# Patient Record
Sex: FEMALE | Race: WHITE | NOT HISPANIC OR LATINO | Employment: UNEMPLOYED | URBAN - METROPOLITAN AREA
[De-identification: names, ages, dates, MRNs, and addresses within clinical notes are randomized per-mention and may not be internally consistent; named-entity substitution may affect disease eponyms.]

---

## 2017-08-08 ENCOUNTER — PATIENT OUTREACH (OUTPATIENT)
Dept: ADMINISTRATIVE | Facility: HOSPITAL | Age: 42
End: 2017-08-08

## 2018-02-07 ENCOUNTER — CLINICAL SUPPORT (OUTPATIENT)
Dept: SMOKING CESSATION | Facility: CLINIC | Age: 43
End: 2018-02-07
Payer: COMMERCIAL

## 2018-02-07 DIAGNOSIS — F17.200 NICOTINE DEPENDENCE: Primary | ICD-10-CM

## 2018-02-07 PROCEDURE — 99406 BEHAV CHNG SMOKING 3-10 MIN: CPT | Mod: S$GLB,,, | Performed by: INTERNAL MEDICINE

## 2022-09-01 ENCOUNTER — TELEPHONE (OUTPATIENT)
Dept: UROLOGY | Facility: CLINIC | Age: 47
End: 2022-09-01
Payer: OTHER GOVERNMENT

## 2022-09-01 ENCOUNTER — OFFICE VISIT (OUTPATIENT)
Dept: UROLOGY | Facility: CLINIC | Age: 47
End: 2022-09-01
Payer: OTHER GOVERNMENT

## 2022-09-01 ENCOUNTER — LAB VISIT (OUTPATIENT)
Dept: LAB | Facility: HOSPITAL | Age: 47
End: 2022-09-01
Attending: NURSE PRACTITIONER
Payer: OTHER GOVERNMENT

## 2022-09-01 VITALS
HEIGHT: 69 IN | SYSTOLIC BLOOD PRESSURE: 131 MMHG | DIASTOLIC BLOOD PRESSURE: 85 MMHG | BODY MASS INDEX: 28.28 KG/M2 | HEART RATE: 81 BPM | RESPIRATION RATE: 18 BRPM | WEIGHT: 190.94 LBS

## 2022-09-01 DIAGNOSIS — R35.1 NOCTURIA: ICD-10-CM

## 2022-09-01 DIAGNOSIS — N32.81 OAB (OVERACTIVE BLADDER): ICD-10-CM

## 2022-09-01 DIAGNOSIS — N32.81 OAB (OVERACTIVE BLADDER): Primary | ICD-10-CM

## 2022-09-01 DIAGNOSIS — N39.3 STRESS INCONTINENCE OF URINE: ICD-10-CM

## 2022-09-01 LAB
CREAT SERPL-MCNC: 0.7 MG/DL (ref 0.5–1.4)
EST. GFR  (NO RACE VARIABLE): >60 ML/MIN/1.73 M^2

## 2022-09-01 PROCEDURE — 36415 COLL VENOUS BLD VENIPUNCTURE: CPT | Performed by: NURSE PRACTITIONER

## 2022-09-01 PROCEDURE — 99999 PR PBB SHADOW E&M-EST. PATIENT-LVL IV: CPT | Mod: PBBFAC,,, | Performed by: NURSE PRACTITIONER

## 2022-09-01 PROCEDURE — 99214 OFFICE O/P EST MOD 30 MIN: CPT | Mod: PBBFAC | Performed by: NURSE PRACTITIONER

## 2022-09-01 PROCEDURE — 99999 PR PBB SHADOW E&M-EST. PATIENT-LVL IV: ICD-10-PCS | Mod: PBBFAC,,, | Performed by: NURSE PRACTITIONER

## 2022-09-01 PROCEDURE — 99204 PR OFFICE/OUTPT VISIT, NEW, LEVL IV, 45-59 MIN: ICD-10-PCS | Mod: S$PBB,,, | Performed by: NURSE PRACTITIONER

## 2022-09-01 PROCEDURE — 99204 OFFICE O/P NEW MOD 45 MIN: CPT | Mod: S$PBB,,, | Performed by: NURSE PRACTITIONER

## 2022-09-01 PROCEDURE — 82565 ASSAY OF CREATININE: CPT | Performed by: NURSE PRACTITIONER

## 2022-09-01 RX ORDER — SOLIFENACIN SUCCINATE 10 MG/1
10 TABLET, FILM COATED ORAL DAILY
Qty: 30 TABLET | Refills: 11 | Status: SHIPPED | OUTPATIENT
Start: 2022-09-01

## 2022-09-01 NOTE — PROGRESS NOTES
Chief Complaint:   Stress incontinence  Daytime frequency    HPI:   Patient is a 47-year-old female that is presenting with an increase in stress incontinence and daytime frequency.  Patient states that nocturia twice nightly.  She is currently on oxybutynin 10 mg once daily and states that medication is not decreasing her overactive bladder symptoms.  Urine in clinic is negative.  PVR was 2 mL.  Unfortunately, patient does drink caffeinated drinks throughout the day and smokes.No abd/pelvic pain and no exac/rel factors.  No hematuria.  No urolithiasis.      Allergies:  Penicillins    Medications:  has a current medication list which includes the following prescription(s): acyclovir, alprazolam, azithromycin, ibuprofen, nicotine, nicotine (polacrilex), and solifenacin.    Review of Systems:  General: No fever, chills, fatigability, or weight loss.  Skin: No rashes, itching, or changes in color or texture of skin.  Chest: Denies PATRICIO, cyanosis, wheezing, cough, and sputum production.  Abdomen: Appetite fine. No weight loss. Denies diarrhea, abdominal pain, hematemesis, or blood in stool.  Musculoskeletal: No joint stiffness or swelling. Denies back pain.  : As above.  All other review of systems negative.    PMH:   has a past medical history of Adjustment disorder with mixed anxiety and depressed mood, Herpes labialis, and HSV-2 (herpes simplex virus 2) infection.    PSH:   has a past surgical history that includes Tubal ligation and Mouth surgery (02/2014).    FamHx: family history includes Breast cancer in her mother; COPD in her father; Cancer in her maternal aunt; Heart disease in her father; Hypertension in her father; Liver cancer in her maternal uncle; Lung cancer in her maternal grandfather; Stroke in her father.    SocHx:  reports that she has been smoking cigarettes. She has been smoking an average of .5 packs per day. She does not have any smokeless tobacco history on file. She reports that she does not  drink alcohol and does not use drugs.      Physical Exam:  Vitals:    09/01/22 1509   BP: 131/85   Pulse: 81   Resp: 18     General:  Extremely anxious female, A&Ox3, no apparent distress, no deformities  Neck: No masses, normal thyroid  Lungs: normal inspiration, no use of accessory muscles  Heart: normal pulse, no arrhythmias  Abdomen: Soft, NT, ND, no masses, no hernias, no hepatosplenomegaly  Lymphatic: Neck and groin nodes negative    Labs/Studies:   See HPI    Impression/Plan:   Overactive bladder and stress incontinence  Patient was educated on behavior modifications needed to decrease her urinary symptoms.  Patient to discontinue oxybutynin and VESIcare prescription given to patient.  Patient has to take her prescriptions to the [facility].  PT pelvic floor training referral was placed.  Patient is very concerned that her kidneys are not functioning well, therefore, patient was sent for creatinine labs today patient to return to clinic for re-evaluation in 2 months.

## 2022-09-01 NOTE — TELEPHONE ENCOUNTER
Reached out to pt to notify her that Leonila Schwartz will not be in clinic this morning and will be here in the afternoon. Pt asked what days is she available and I informed her that Ms. Schwartz is available Mon-Thur 8am-5pm. Pt stated that she will call back.

## 2022-09-01 NOTE — TELEPHONE ENCOUNTER
Attempt  to reach out to pt regarding her message but was unsuccessful. LVM informing the pt that she can call us back.

## 2022-09-06 ENCOUNTER — PATIENT MESSAGE (OUTPATIENT)
Dept: UROLOGY | Facility: CLINIC | Age: 47
End: 2022-09-06
Payer: OTHER GOVERNMENT

## 2022-09-06 NOTE — TELEPHONE ENCOUNTER
Attempted to call pt to inform her that her  creatinine is completely normal, therefore, her her kidneys are functioning perfectly.  No answer.  Phone just rang. Her kidneys are functioning normally and there is no concern at all.

## 2022-09-15 ENCOUNTER — TELEPHONE (OUTPATIENT)
Dept: UROLOGY | Facility: CLINIC | Age: 47
End: 2022-09-15
Payer: OTHER GOVERNMENT

## 2022-09-15 NOTE — TELEPHONE ENCOUNTER
Informed pt that Leonila Schwartz was out on vacation for two weeks. Pt stated that she wanted to know the results for her creatinine and that she didn't want to speak w/ the nurse about it. Pt stated that Ms. Jeong told her that her kidneys were not functioning properly and asked if there was a message from the encounter. We notified the pt that we did not see a message stating that Ms. Jeong spoke to her but instead there was a message stating that per Leonila Schwartz, her kidney functions are normal. She was then notified of her next upcoming appt w/ Leonila Schwartz. Pt vu

## 2022-10-03 ENCOUNTER — CLINICAL SUPPORT (OUTPATIENT)
Dept: REHABILITATION | Facility: HOSPITAL | Age: 47
End: 2022-10-03
Payer: OTHER GOVERNMENT

## 2022-10-03 DIAGNOSIS — N32.81 OAB (OVERACTIVE BLADDER): ICD-10-CM

## 2022-10-03 DIAGNOSIS — M62.89 PELVIC FLOOR DYSFUNCTION IN FEMALE: ICD-10-CM

## 2022-10-03 DIAGNOSIS — R35.1 NOCTURIA: ICD-10-CM

## 2022-10-03 DIAGNOSIS — N39.3 STRESS INCONTINENCE OF URINE: ICD-10-CM

## 2022-10-03 PROCEDURE — 97162 PT EVAL MOD COMPLEX 30 MIN: CPT | Mod: PN

## 2022-10-03 PROCEDURE — 97530 THERAPEUTIC ACTIVITIES: CPT | Mod: PN

## 2022-10-03 NOTE — PATIENT INSTRUCTIONS
"Kegels- Supported Posterior Pelvic Tilt    Begin lying on your back with your knees bent and feet flat on the floor, and a firm pillow under your pelvis to help maintain an posteriorly rotated pelvis as shown in the picture. This will help flatten the arch in your back so that your back remains flat on the floor.       Once youre in position, begin with taking deep belly breaths while you bring awareness to your pelvic floor.  Consciously try to get a feel for what it feels like to contract and relax your pelvic floor muscles so you can feel the difference between shortening (pulling in) and lengthening (letting go).     Now picture your pelvic floor as an elevator. When you INHALE deeply contract the pelvic floor muscle and make the elevator go up a couple of floors.  Hold the elevator up for 5 seconds.   Now EXHALE all the air out through your mouth while you relax the pelvic floor muscles SLOWLY letting the "elevator go back down to the ground level" slowly letting that tension go for about 5 seconds.    Once your pelvic floor muscles are relaxed and the elevator is back at ground level rest for one whole breathing cycle of inhalation/exhalation about 10 seconds.      Repeat  this a total of 10 repetitions of raising and lowering the elevator with 10 second rest in between each repetition.            Pelvic floor contraction/Kegel with activity     Sit upright with good posture or remain in whatever position you are in when the laughing/coughing/sneezing occurs. Contract your pelvic floor muscles as if you are holding back urine or holding back gas when you are about to laugh/cough/sneeze.  Hold this contract for as long as the stressful activity continues (coughing, sneezing, laughing) while continuing your normal breathing pattern. Be sure to perform a full relax in between each contract. Do not hold your breath.      Home Exercise Program: 10/03/2022    CONTROLLING URINARY / FECAL URGENCY    What to do when " you experience a strong urge to urinate or defecate:     FIRST  Stop activity, stand quietly or sit down. Try to stay very still to maintain control. Avoid rushing to the toilet.    SECOND Begin Quick Flicks (1 second LIFT of pelvic floor muscles, 4 second DROP). Pelvic floor contractions send a message to the bladder to relax and hold urine.     THIRD Relax. Do not rush to the toilet. Take a deep belly or diaphragmatic breath and let it out slowly. Let the urge to urinate pass by using distraction techniques and positive thoughts. Try not to think about going to the bathroom.     FINALLY If the urge returns, repeat the above steps to regain control. When you feel the urge subside, walk normally to the bathroom. You can urinate once the urge has subsided.        The urge feeling strikes!   Stop, breathe, and be still and then begin Quick Flicks     Do Not rush to the toilet.     Think positively and distract yourself.

## 2022-10-03 NOTE — PLAN OF CARE
"[facility]   Physical Therapy Initial Evaluation     Date: 10/3/2022   Name: Diana Meneses  Clinic Number: [id number]    Therapy Diagnosis:   Encounter Diagnoses   Name Primary?    OAB (overactive bladder)     Stress incontinence of urine     Nocturia     Pelvic floor dysfunction in female      Physician: Leonila Schwartz NP    Physician Orders: PT Eval and Treat   Medical Diagnosis from Referral:   N32.81 (ICD-10-CM) - OAB (overactive bladder)   N39.3 (ICD-10-CM) - Stress incontinence of urine   R35.1 (ICD-10-CM) - Nocturia     Evaluation Date: 10/3/2022  Authorization Period Expiration: 2023  Plan of Care Expiration: 1/3/2023  Progress Note Due: 11/3/2022  Visit # / Visits authorized:    FOTO: 1/3    Precautions: Standard and Diabetes     Time In: 11:36  Time Out: 12:33  Total Appointment Time (timed & untimed codes): 57 minutes    SUBJECTIVE     Date of onset: Patient reports urinary incontinence for 2 years and pelvic pain for the past 2 months.     History of current condition - Diana reports: her primary care gave her medicine for urinary incontinence and it wasn't working. Primary care then sent her to urologist. The urologist changed her medicine and "it changed her life." She really doesn't have problems with incontinence now. Her incontinence is 90% better since starting medicine. A month before she saw the urologist she had a bladder infection and has another bladder infection. She takes 13 other pills a day and doesn't want to take another antibiotic. This is her 2nd bladder infection in 2 months. She recently had 10 days of a very heavy bleeding period. Her pelvic area is really hurting. She finished her most recent antibiotic, but feels like she is getting another one. She stills leaks urine with stress now,. The medicine has resolved her urge incontinence.  Patient reports she has chronic pain and nausea from the pain.         OB/GYN History: , vaginal delivery, " episiotomy, vaginal dryness, vaginal erythema, and painful vaginal penetration  Using vaginal estrogen cream: No  Sexually active? No for the past 4 months, too painful   Pain with vaginal exams, intercourse or tampon use? with intercourse and with vaginal exam    Bladder/Bowel History: recurrent bladder infections and urinary incontinence  Frequency of urination:   Daytime: high frequency secondary to diabetes           Nighttime: twice   Difficulty initiating urine stream: No  Urine stream: strong  Complete emptying: Yes  Bladder leakage: Yes  Activities that cause leakage: stress  Frequency of incidents: 2-3 times a day   Amount leaked (urine): few drops  Urinary Urgency: No.  Able to delay the urge for at least 0-5 minute(s).  Pain with delaying the urge to urinate: No     Frequency of bowel movements: 3 times a day to once every two days  Difficulty initiating BM: No  Quality/Shape of BM: normal  Does Patient Feel Empty after BM? Yes  Bowel Urgency: No.  Able to delay the urge for at least 0-60 minute(s).  Fiber Supplements or Laxative Use? No   Pain with BM: No   Bleeding with BM: No   Colon leakage: No      Form of protection: panty liner   Number of pads required in 24 hours: 2-3 times     Pain:  Location: pelvic pain   Current 8/10, worst 10/10, best 5/10   Pelvic Pain Duration Occurs during and after provocation   Location of Pelvic Pain: Deep pelvic floor muscles   Description: bloated and throbbing   Aggravating Factors/Activities that cause symptoms: Vaginal exam/provocation   Easing Factors: nothing      Medical History: Diana  has a past medical history of Adjustment disorder with mixed anxiety and depressed mood, Herpes labialis, and HSV-2 (herpes simplex virus 2) infection.     Surgical History: Diana Meneses  has a past surgical history that includes Tubal ligation and Mouth surgery (02/2014).    Medications: Diana has a current medication list which includes the following prescription(s):  acyclovir, alprazolam, azithromycin, ibuprofen, nicotine, nicotine (polacrilex), and solifenacin.    Allergies:   Review of patient's allergies indicates:   Allergen Reactions    Penicillins Other (See Comments)     unknown        Imaging none    Prior [facility]/Previous [facility] included[facility]   Social History: lives with spouse   Occupation: not working out of the home.   Prior Level of Function: Pt was independent with all ADLs and iADLs without pain, no reports of incontinence of bowel or bladder.  Current Level of Function: Patient is modified independent with functional mobility and ADLs secondary to being limited by pain and urinary incontinence     Types of fluid intake: Water: 1 bottle a /day, Coffee: 0-1/day, and Soda  Diet: Standard  Habitus: overweight  Abuse/Neglect: Pt denies a history of physical or emotional abuse at this visit.       Pts goals: to get rid of pain and urinary incontinence     OBJECTIVE     See EMR under MEDIA for written consent provided 10/3/2022, signed consent then verbally refused pelvic floor exam, deferred exam at this time   Chaperone: declined    ORTHO SCREEN  Posture in sitting: slouched  and sacral sitting  Posture in standing: pain posturing evident   Pelvic alignment: Not assessed today    Adductor Palpation: tender and boggy    ABDOMINAL WALL ASSESSMENT  Palpation: hypotonic    Diastasis: absent: only 1-1.5 cm gap      BREATHING MECHANICS ASSESSMENT   Thorax Assessment During Quiet Respiration: Decreased excursion of abdominal wall  and Decreased excursion bilaterally of lateral ribs         TREATMENT     Treatment Time In: 12:23  Treatment Time Out: 12:33  Total Treatment time (time-based codes) separate from Evaluation: 10 minutes    Therapeutic Activity Patient participated in dynamic functional therapeutic activities to improve functional performance for 10 minutes. Including: Education as described below.     Patient Education  provided:   general anatomy/physiology of urinary/ bowel  system, benefits of treatment, risks of treatment, and alternative methods of treatment was discussed with the pt. Additionally, bladder irritants, anatomy/physiology of pelvic floor, posture/body mechanices, bladder retraining, diaphragmatic breathing, kegels, fluid intake/dietary modifications, and Coordination of kegels with functional activities such as cough, laugh, sneeze, lift, etc.  was reviewed.     Home Exercises provided:  Written Home Exercises provided: yes.  Exercises were reviewed and Diana was able to demonstrate them prior to the end of the session.    Diana demonstrated good  understanding of the education provided.     See EMR under Patient Instructions for exercises provided 10/3/2022.    ASSESSMENT     Diana is a 47 y.o. female referred to outpatient Physical Therapy with a medical diagnosis of   N32.81 (ICD-10-CM) - OAB (overactive bladder)   N39.3 (ICD-10-CM) - Stress incontinence of urine   R35.1 (ICD-10-CM) - Nocturia   Pt presents with altered posture, poor knowledge of body mechanics and posture, increased frequency of urination, increased nocturia, poor coordination of pelvic floor muscles during ADL's leading to urinary or fecal leakage, poor fluid intake, and chronic UTI due to dysfunctional voiding. Patient presents with signs and symptoms consistent with pelvic floor dysfunction.      Pt prognosis is Fair.   Pt will benefit from skilled outpatient Physical Therapy to address the deficits stated above and in the chart below, provide pt/family education, and to maximize pt's level of independence.     Plan of care discussed with patient: Yes  Pt's spiritual, cultural and educational needs considered and patient is agreeable to the plan of care and goals as stated below:     Anticipated Barriers for therapy: pain, decreased exercise tolerance, therapy compliance     Medical Necessity is demonstrated by the  "following:    History  Co-morbidities and personal factors that may impact the plan of care Co-morbidities   anxiety, consumption of bladder irritants, depression, diabetes, and recurrent urinary infections    Personal Factors  age     moderate   Examination  Body structures and functions, activity limitations and participation restrictions that may impact the plan of care Body Regions/Systems/Functions:  altered posture, poor knowledge of body mechanics and posture, increased frequency of urination, increased nocturia, poor coordination of pelvic floor muscles during ADL's leading to urinary or fecal leakage, poor fluid intake, and chronic UTI due to dysfunctional voiding     Activity Limitations:  urgency , delaying urge to urinate, pain with full bladder affecting ADL participation and/or sleep, intercourse/vaginal exam/tampon use without pain, sleep uninterrupted by excessive nocturia, incontinence with ADLs, and Pain with ADLs    Participation Restrictions:  all ADLs/iADLs uninterrupted by urinary incontinence/urgency/frequency, social activities with friends/family, relationship with spouse/partner, ADL participation affected by pain, and exercise restrictions due to incontinence     Activity limitations:   Learning and applying knowledge  no deficits    General Tasks and Commands  no deficits    Communication  no deficits    Mobility  lifting and carrying objects  walking    Self care  toileting    Domestic Life  shopping  cooking  doing house work (cleaning house, washing dishes, laundry)    Interactions/Relationships  intimate relationships    Life Areas  no deficits    Community and Social Life  community life  recreation and leisure       moderate   Clinical Presentation evolving clinical presentation with changing clinical characteristics moderate   Decision Making/ Complexity Score: moderate       Goals:  Short Term Goals: 4 weeks   Pt to perform "the knack" prior to coughing, laughing or sneezing to " decrease risk of incontinence.  Pt to report a decrease in pad usage to no more than 1 a day to demonstrate improving pelvic floor function needed for continence.  Pt to voice understanding of the role that diet plays on urinary urgency.    Pt to demonstrate proper diaphragmatic breathing to help with calming the nervous system in order to decrease pain and to improve abdominal wall musculature extensibility.   Pt to demonstrate good body mechanics when performing ADLs such as lifting and bending to decrease strain to lumbopelvic structures and reduce risk of further injury.  Pt to report minimal pain with palpation of abdominal wall due to improvement in soft tissue mobility from moderate to minimal restrictions.    Long Term Goals: 12 weeks   Pt to be discharged with home plan for carry over after discharge.    Pt to report a decrease in pad usage to 0 pads a day to demonstrate improving pelvic floor muscle controls as evidenced by decreased episodes of incontinence needed to improve confidence in social situations.  Pt will be trained and compliant with postural strategies in sitting and standing to improve alignment and decrease pain and muscle fatigue  Pt to report being able to have a comfortable vaginal exam without significant increase in pelvic pain.  Pt to report being able to hold urine for at least 10 without reporting a significant increase in pain to demonstrate a return towards prior level of function.       PLAN   Plan of care Certification: 10/3/2022 to 1/3/2023.    Outpatient Physical Therapy 1 times weekly for 12 weeks to include the following interventions: therapeutic exercises, therapeutic activity, neuromuscular re-education, manual therapy, modalities PRN, patient/family education, orthotic fabrication, and self care/home management    Cora Barrera, PT, DPT, PPCES      I CERTIFY THE NEED FOR THESE SERVICES FURNISHED UNDER THIS PLAN OF TREATMENT AND WHILE UNDER MY CARE   Physician's  comments:     Physician's Signature: ___________________________________________________

## 2022-10-14 NOTE — PROGRESS NOTES
"  Physical Therapy Daily Treatment Note     Name: Diana Kidd Gideon  Clinic Number: [id number]    Therapy Diagnosis:   Encounter Diagnosis   Name Primary?    Pelvic floor dysfunction in female Yes     Physician: Leonila Schwartz NP    Visit Date: 10/17/2022    Physician Orders: PT Eval and Treat   Medical Diagnosis from Referral:   N32.81 (ICD-10-CM) - OAB (overactive bladder)   N39.3 (ICD-10-CM) - Stress incontinence of urine   R35.1 (ICD-10-CM) - Nocturia      Evaluation Date: 10/3/2022  Authorization Period Expiration: 9/1/2023  Plan of Care Expiration: 1/3/2023  Progress Note Due: 11/3/2022  Visit # / Visits authorized: 1/ 14   FOTO: 1/3     Precautions: Standard and Diabetes     Time In: 4:10 (patient late)  Time Out: 4:50  Total Billable Time: 40 minutes    Precautions: Standard    Subjective     Pt reports: not knowing if she is doing the kegels correctly.   She was compliant with home exercise program.  Response to previous treatment: no change   Functional change: no change     Pain: 0/10  Location: non pain     Constitutional Symptoms Review: The patient denies having any constitutional symptoms.     Objective   NO PELVIC EXAM    Therapeutic Exercise to develop  strength, endurance, ROM, flexibility, posture, and core stabilization for 30 minutes including:   Bridges 2 x 10   SL clams with GTB 2 x 10    Steam boats x 10   Seated ritesh pose 10 x 5"   Supine butterfly 10 x 10"   SL add 2 x 10   Sit to stands with add ball 2 x 10   Alt HL hip abduction/add with ball 10 x 10 seconds   DKTC 2 x 10   Seated opp arm/ leg raises   SL hip abd 2 x 10   Anti rotations x 10 7#   SL reverse clams 2 x 10   posterior pelvic tilts 10 x 10 seconds   side stepping      Neuromuscular Re-education to develop Coordination and Control for 10 minutes including:   TA contraction 10" x 10               With march x 10               With knee fall out x 10               With kegel x 10   diaphragmatic breathing x 1 minutes   kegel Quick " "flicks with posterior pelvic tilt 2 x 10    With march    Endurance holds seated on towel 5" x 10               kegels with squats x 10 (up-relax-down)   Kegel on physio ball 2 x 10      Manual Therapy to develop flexibility, extensibility, and desensitization for 0 minutes including: none     Therapeutic Activity Education provided throughout session as described below.    Home Exercises Provided and Patient Education Provided     Education provided:   bladder irritants, anatomy/physiology of pelvic floor, posture/body mechanices, bladder retraining, diaphragmatic breathing, isometric abdominal exercises, kegels, behavior modifications, and Coordination of kegels with functional activities such as cough, laugh, sneeze, lift, etc.   Discussed progression of plan of care with patient; educated pt in activity modification; reviewed HEP with pt. Pt demonstrated and verbalized understanding of all instruction and was provided with a handout of HEP (see Patient Instructions).    Written Home Exercises Provided: yes.  Exercises were reviewed and Diana was able to demonstrate them prior to the end of the session.  Diana demonstrated good  understanding of the education provided.     See EMR under Patient Instructions for exercises provided 10/17/2022.    Assessment     Patient presents with urinary incontinence and increased urinary urge. Patient was started on pelvic floor muscle strengthening and stretching for optional length tension relationship of muscles. Core and hip strengthening exercises were performed for increased postural stability to better support pelvic floor musculature. Patient tolerated all exercises well with no complaints. Patient was taught Kegels seated on towel for tactile cue and patient reports she felt more confident she was doing correctly. Pt will continue to benefit from skilled outpatient physical therapy to address the deficits listed in the problem list box on initial evaluation, provide " "pt/family education and to maximize pt's level of independence in the home and community environment.     Diana Is progressing well towards her goals.   Pt prognosis is Good.     Anticipated barriers to physical therapy: pain, decreased exercise tolerance     Progress towards goals:  good    Goals:   Short Term Goals: 4 weeks   Pt to perform "the knack" prior to coughing, laughing or sneezing to decrease risk of incontinence.  Pt to report a decrease in pad usage to no more than 1 a day to demonstrate improving pelvic floor function needed for continence.  Pt to voice understanding of the role that diet plays on urinary urgency.    Pt to demonstrate proper diaphragmatic breathing to help with calming the nervous system in order to decrease pain and to improve abdominal wall musculature extensibility.   Pt to demonstrate good body mechanics when performing ADLs such as lifting and bending to decrease strain to lumbopelvic structures and reduce risk of further injury.  Pt to report minimal pain with palpation of abdominal wall due to improvement in soft tissue mobility from moderate to minimal restrictions.     Long Term Goals: 12 weeks   Pt to be discharged with home plan for carry over after discharge.    Pt to report a decrease in pad usage to 0 pads a day to demonstrate improving pelvic floor muscle controls as evidenced by decreased episodes of incontinence needed to improve confidence in social situations.  Pt will be trained and compliant with postural strategies in sitting and standing to improve alignment and decrease pain and muscle fatigue  Pt to report being able to have a comfortable vaginal exam without significant increase in pelvic pain.  Pt to report being able to hold urine for at least 10 without reporting a significant increase in pain to demonstrate a return towards prior level of function.     New/Revised goals:  Continue with current established goals at this time.      Pt's spiritual, cultural " and educational needs considered and pt agreeable to plan of care and goals.    Plan     Plan of care Certification: 10/3/2022 to 1/3/2023.     Continue with established Plan of Care, working toward established PT goals.    Cora Barrera, PT, DPT, PPCES

## 2022-10-17 ENCOUNTER — CLINICAL SUPPORT (OUTPATIENT)
Dept: REHABILITATION | Facility: HOSPITAL | Age: 47
End: 2022-10-17
Payer: OTHER GOVERNMENT

## 2022-10-17 DIAGNOSIS — M62.89 PELVIC FLOOR DYSFUNCTION IN FEMALE: Primary | ICD-10-CM

## 2022-10-17 PROCEDURE — 97112 NEUROMUSCULAR REEDUCATION: CPT | Mod: PN

## 2022-10-17 PROCEDURE — 97110 THERAPEUTIC EXERCISES: CPT | Mod: PN

## 2022-10-25 NOTE — PROGRESS NOTES
"  Physical Therapy Daily Treatment Note     Name: Diana Kidd Gideon  Clinic Number: [id number]    Therapy Diagnosis:   Encounter Diagnosis   Name Primary?    Pelvic floor dysfunction in female Yes     Physician: Leonila Schwartz NP    Visit Date: 10/26/2022    Physician Orders: PT Eval and Treat   Medical Diagnosis from Referral:   N32.81 (ICD-10-CM) - OAB (overactive bladder)   N39.3 (ICD-10-CM) - Stress incontinence of urine   R35.1 (ICD-10-CM) - Nocturia      Evaluation Date: 10/3/2022  Authorization Period Expiration: 9/1/2023  Plan of Care Expiration: 1/3/2023  Progress Note Due: 11/3/2022  Visit # / Visits authorized: 2/ 14   FOTO: 1/3     Precautions: Standard and Diabetes     Time In: 11:02  Time Out: 11:56  Total Billable Time: 54 minutes    Precautions: Standard    Subjective     Pt reports: she feels normal. Patient reports not having any incontinence and decreased urinary urgency. Patient reports having no pain today.  She was compliant with home exercise program.  Response to previous treatment: no change   Functional change: no change     Pain: 0/10  Location: non pain     Constitutional Symptoms Review: The patient denies having any constitutional symptoms.     Objective   NO PELVIC EXAM    Therapeutic Exercise to develop  strength, endurance, ROM, flexibility, posture, and core stabilization for 30 minutes including:   Bridges 2 x 10   SL clams with GTB 2 x 10    Steam boats x 10   Seated ritesh pose 10" x 5   Supine butterfly 10 x 10"   SL add 2 x 10   Sit to stands with add ball 2 x 10   Alt HL hip abduction/add with ball 10 x 10 seconds   DKTC 2 x 10   Seated opp arm/ leg raises   SL hip abd 2 x 10   + Anti rotations 2 x 10 7#   SL reverse clams 2 x 10   posterior pelvic tilts 10 x 10 seconds   side stepping      Neuromuscular Re-education to develop Coordination and Control for 24 minutes including:   TA contraction 5" x 10               With march x 10               With knee fall out x 10             " "  With kegel x 10   diaphragmatic breathing x 1 minutes   kegel Quick flicks with posterior pelvic tilt 2 x 10    With march 2 x 10    Endurance holds seated on towel 5" x 10               kegels with squats x 10 (up-relax-down)   Kegel on physio ball 2 x 10      Manual Therapy to develop flexibility, extensibility, and desensitization for 0 minutes including: none     Therapeutic Activity Education provided throughout session as described below.    Home Exercises Provided and Patient Education Provided     Education provided:   bladder irritants, anatomy/physiology of pelvic floor, posture/body mechanices, bladder retraining, diaphragmatic breathing, isometric abdominal exercises, kegels, behavior modifications, and Coordination of kegels with functional activities such as cough, laugh, sneeze, lift, etc.   Discussed progression of plan of care with patient; educated pt in activity modification; reviewed HEP with pt. Pt demonstrated and verbalized understanding of all instruction and was provided with a handout of HEP (see Patient Instructions).    Written Home Exercises Provided: yes.  Exercises were reviewed and Diana was able to demonstrate them prior to the end of the session.  Diana demonstrated good  understanding of the education provided.     See EMR under Patient Instructions for exercises provided 10/17/2022.    Assessment     Patient presents with resolution of urinary incontinence and urinary urgency. Anti rotations were performed for increased core stability. Patient is progressing well with therapy and has met 6 out of 11 goals. Patient tolerated all exercises well with no complaints. Pt will continue to benefit from skilled outpatient physical therapy to address the deficits listed in the problem list box on initial evaluation, provide pt/family education and to maximize pt's level of independence in the home and community environment.     Diana Is progressing well towards her goals.   Pt " "prognosis is Good.     Anticipated barriers to physical therapy: pain, decreased exercise tolerance     Progress towards goals:  good    Goals:   Short Term Goals: 4 weeks   Pt to perform "the knack" prior to coughing, laughing or sneezing to decrease risk of incontinence. MET 10/26/2022  Pt to report a decrease in pad usage to no more than 1 a day to demonstrate improving pelvic floor function needed for continence. MET 10/26/2022  Pt to voice understanding of the role that diet plays on urinary urgency.  MET 10/26/2022  Pt to demonstrate proper diaphragmatic breathing to help with calming the nervous system in order to decrease pain and to improve abdominal wall musculature extensibility. MET 10/26/2022  Pt to demonstrate good body mechanics when performing ADLs such as lifting and bending to decrease strain to lumbopelvic structures and reduce risk of further injury.  Pt to report minimal pain with palpation of abdominal wall due to improvement in soft tissue mobility from moderate to minimal restrictions.     Long Term Goals: 12 weeks   Pt to be discharged with home plan for carry over after discharge.    Pt to report a decrease in pad usage to 0 pads a day to demonstrate improving pelvic floor muscle controls as evidenced by decreased episodes of incontinence needed to improve confidence in social situations. MET 10/26/2022  Pt will be trained and compliant with postural strategies in sitting and standing to improve alignment and decrease pain and muscle fatigue  Pt to report being able to have a comfortable vaginal exam without significant increase in pelvic pain.  Pt to report being able to hold urine for at least 10 without reporting a significant increase in pain to demonstrate a return towards prior level of function. MET 10/26/2022    New/Revised goals:  Continue with current established goals at this time.      Pt's spiritual, cultural and educational needs considered and pt agreeable to plan of care and " goals.    Plan     Plan of care Certification: 10/3/2022 to 1/3/2023.     Continue with established Plan of Care, working toward established PT goals.    Cora Barrera, PT, DPT, PPCES

## 2022-10-26 ENCOUNTER — CLINICAL SUPPORT (OUTPATIENT)
Dept: REHABILITATION | Facility: HOSPITAL | Age: 47
End: 2022-10-26
Payer: OTHER GOVERNMENT

## 2022-10-26 DIAGNOSIS — M62.89 PELVIC FLOOR DYSFUNCTION IN FEMALE: Primary | ICD-10-CM

## 2022-10-26 PROCEDURE — 97110 THERAPEUTIC EXERCISES: CPT | Mod: PN

## 2022-10-26 PROCEDURE — 97112 NEUROMUSCULAR REEDUCATION: CPT | Mod: PN

## 2022-10-28 NOTE — PROGRESS NOTES
"  Physical Therapy Daily Treatment Note     Name: Diana Kidd Gideon  Clinic Number:[id number]    Therapy Diagnosis:   Encounter Diagnosis   Name Primary?    Pelvic floor dysfunction in female Yes       Physician: Leonila Schwartz NP    Visit Date: 10/31/2022    Physician Orders: PT Eval and Treat   Medical Diagnosis from Referral:   N32.81 (ICD-10-CM) - OAB (overactive bladder)   N39.3 (ICD-10-CM) - Stress incontinence of urine   R35.1 (ICD-10-CM) - Nocturia      Evaluation Date: 10/3/2022  Authorization Period Expiration: 9/1/2023  Plan of Care Expiration: 1/3/2023  Progress Note Due: 11/3/2022  Visit # / Visits authorized: 3/ 14   FOTO: 1/3     Precautions: Standard and Diabetes     Time In: 11:31  Time Out: 12:16  Total Billable Time: 45 minutes    Precautions: Standard    Subjective     Pt reports: she feels normal. Patient reports not having any incontinence and decreased urinary urgency. Patient reports having no pain today. Patient reports feeling to make today her last day.   She was compliant with home exercise program.  Response to previous treatment: no change   Functional change: no change     Pain: 0/10  Location: non pain     Constitutional Symptoms Review: The patient denies having any constitutional symptoms.     Objective   NO PELVIC EXAM    Therapeutic Exercise to develop  strength, endurance, ROM, flexibility, posture, and core stabilization for 30 minutes including:   Bridges 2 x 10   SL clams with GTB 2 x 10    Steam boats x 10   Seated ritesh pose 10" x 5   Supine butterfly 10 x 10"   SL add 2 x 10   Sit to stands with add ball 2 x 10   Alt HL hip abduction/add with ball 10 x 10 seconds   DKTC 2 x 10   Seated opp arm/ leg raises   SL hip abd 2 x 10   Anti rotations 2 x 10 7#   SL reverse clams 2 x 10   posterior pelvic tilts 10 x 10 seconds   side stepping      Neuromuscular Re-education to develop Coordination and Control for 15 minutes including:   TA contraction 5" x 10               With march x " "10               With knee fall out x 10               With kegel x 10   diaphragmatic breathing x 1 minutes   kegel Quick flicks with posterior pelvic tilt 2 x 10    With march 2 x 10    Endurance holds seated on towel 5" x 10               kegels with squats x 10 (up-relax-down)   Kegel on physio ball 2 x 10      Manual Therapy to develop flexibility, extensibility, and desensitization for 0 minutes including: none     Therapeutic Activity Education provided throughout session as described below.    Home Exercises Provided and Patient Education Provided     Education provided:   bladder irritants, anatomy/physiology of pelvic floor, posture/body mechanices, bladder retraining, diaphragmatic breathing, isometric abdominal exercises, kegels, behavior modifications, and Coordination of kegels with functional activities such as cough, laugh, sneeze, lift, etc.   Discussed progression of plan of care with patient; educated pt in activity modification; reviewed HEP with pt. Pt demonstrated and verbalized understanding of all instruction and was provided with a handout of HEP (see Patient Instructions).    Written Home Exercises Provided: yes.  Exercises were reviewed and Diana was able to demonstrate them prior to the end of the session.  Diana demonstrated good  understanding of the education provided.     See EMR under Patient Instructions for exercises provided 10/17/2022.    Assessment     Patient presents reporting complete resolution of all of her symptoms. Patient reports being ready to discharge from therapy. Patient  has met of of her goals and has no farther need for pelvic floor therapy at this time.     Diana Is progressing well towards her goals.   Pt prognosis is Good.     Anticipated barriers to physical therapy: pain, decreased exercise tolerance     Progress towards goals:  good    Goals:   Short Term Goals: 4 weeks   Pt to perform "the knack" prior to coughing, laughing or sneezing to decrease risk " of incontinence. MET 10/26/2022  Pt to report a decrease in pad usage to no more than 1 a day to demonstrate improving pelvic floor function needed for continence. MET 10/26/2022  Pt to voice understanding of the role that diet plays on urinary urgency.  MET 10/26/2022  Pt to demonstrate proper diaphragmatic breathing to help with calming the nervous system in order to decrease pain and to improve abdominal wall musculature extensibility. MET 10/26/2022  Pt to demonstrate good body mechanics when performing ADLs such as lifting and bending to decrease strain to lumbopelvic structures and reduce risk of further injury. MET 11/1/2022  Pt to report minimal pain with palpation of abdominal wall due to improvement in soft tissue mobility from moderate to minimal restrictions. MET 11/1/2022     Long Term Goals: 12 weeks   Pt to be discharged with home plan for carry over after discharge.  MET 11/1/2022  Pt to report a decrease in pad usage to 0 pads a day to demonstrate improving pelvic floor muscle controls as evidenced by decreased episodes of incontinence needed to improve confidence in social situations. MET 10/26/2022  Pt will be trained and compliant with postural strategies in sitting and standing to improve alignment and decrease pain and muscle fatigueMET 11/1/2022  Pt to report being able to have a comfortable vaginal exam without significant increase in pelvic pain. Did not assess  Pt to report being able to hold urine for at least 10 without reporting a significant increase in pain to demonstrate a return towards prior level of function. MET 10/26/2022    New/Revised goals:  Continue with current established goals at this time.      Pt's spiritual, cultural and educational needs considered and pt agreeable to plan of care and goals.    Plan     Plan of care Certification: 10/3/2022 to 1/3/2023.     Discharge from pelvic floor physcial therapy     Cora Barrera, PT, DPT, PPCES

## 2022-10-31 ENCOUNTER — CLINICAL SUPPORT (OUTPATIENT)
Dept: REHABILITATION | Facility: HOSPITAL | Age: 47
End: 2022-10-31
Payer: OTHER GOVERNMENT

## 2022-10-31 DIAGNOSIS — M62.89 PELVIC FLOOR DYSFUNCTION IN FEMALE: Primary | ICD-10-CM

## 2022-10-31 PROCEDURE — 97110 THERAPEUTIC EXERCISES: CPT | Mod: PN

## 2022-10-31 PROCEDURE — 97112 NEUROMUSCULAR REEDUCATION: CPT | Mod: PN

## 2022-11-01 ENCOUNTER — OFFICE VISIT (OUTPATIENT)
Dept: UROLOGY | Facility: CLINIC | Age: 47
End: 2022-11-01
Payer: OTHER GOVERNMENT

## 2022-11-01 VITALS
BODY MASS INDEX: 28.14 KG/M2 | WEIGHT: 190 LBS | SYSTOLIC BLOOD PRESSURE: 130 MMHG | HEIGHT: 69 IN | HEART RATE: 90 BPM | DIASTOLIC BLOOD PRESSURE: 79 MMHG

## 2022-11-01 DIAGNOSIS — N32.81 OAB (OVERACTIVE BLADDER): Primary | ICD-10-CM

## 2022-11-01 PROCEDURE — 99999 PR PBB SHADOW E&M-EST. PATIENT-LVL III: CPT | Mod: PBBFAC,,, | Performed by: NURSE PRACTITIONER

## 2022-11-01 PROCEDURE — 99214 OFFICE O/P EST MOD 30 MIN: CPT | Mod: S$PBB,,, | Performed by: NURSE PRACTITIONER

## 2022-11-01 PROCEDURE — 99999 PR PBB SHADOW E&M-EST. PATIENT-LVL III: ICD-10-PCS | Mod: PBBFAC,,, | Performed by: NURSE PRACTITIONER

## 2022-11-01 PROCEDURE — 99214 PR OFFICE/OUTPT VISIT, EST, LEVL IV, 30-39 MIN: ICD-10-PCS | Mod: S$PBB,,, | Performed by: NURSE PRACTITIONER

## 2022-11-01 PROCEDURE — 99213 OFFICE O/P EST LOW 20 MIN: CPT | Mod: PBBFAC | Performed by: NURSE PRACTITIONER

## 2022-11-01 NOTE — PROGRESS NOTES
Chief Complaint:   Overactive bladder    HPI:   Patient is a 47-year-old female that is presenting as a follow-up to [facility]Icare and pelvic floor training.  Patient states that she has had a complete resolution to all overactive bladder symptoms.  Is currently on VESIcare 10 mg once daily, denies adverse side effects.  Urine in clinic is negative.  PVR is 3 mL.  Patient is extremely satisfied with PT pelvic floor training and wants to complete her sessions.  09/01/2022  Patient is a 47-year-old female that is presenting with an increase in stress incontinence and daytime frequency.  Patient states that nocturia twice nightly.  She is currently on oxybutynin 10 mg once daily and states that medication is not decreasing her overactive bladder symptoms.  Urine in clinic is negative.  PVR was 2 mL.  Unfortunately, patient does drink caffeinated drinks throughout the day and smokes.No abd/pelvic pain and no exac/rel factors.  No hematuria.  No urolithiasis    Allergies:  Penicillins    Medications:  has a current medication list which includes the following prescription(s): acyclovir, alprazolam, azithromycin, ibuprofen, nicotine, nicotine (polacrilex), and solifenacin.    Review of Systems:  General: No fever, chills, fatigability, or weight loss.  Skin: No rashes, itching, or changes in color or texture of skin.  Chest: Denies PATRICIO, cyanosis, wheezing, cough, and sputum production.  Abdomen: Appetite fine. No weight loss. Denies diarrhea, abdominal pain, hematemesis, or blood in stool.  Musculoskeletal: No joint stiffness or swelling. Denies back pain.  : As above.  All other review of systems negative.    PMH:   has a past medical history of Adjustment disorder with mixed anxiety and depressed mood, Herpes labialis, and HSV-2 (herpes simplex virus 2) infection.    PSH:   has a past surgical history that includes Tubal ligation and Mouth surgery (02/2014).    FamHx: family history includes Breast cancer in her mother;  COPD in her father; Cancer in her maternal aunt; Heart disease in her father; Hypertension in her father; Liver cancer in her maternal uncle; Lung cancer in her maternal grandfather; Stroke in her father.    SocHx:  reports that she has been smoking cigarettes. She has been smoking an average of .5 packs per day. She does not have any smokeless tobacco history on file. She reports that she does not drink alcohol and does not use drugs.      Physical Exam:  Vitals:    11/01/22 1419   BP: 130/79   Pulse: 90     General: A&Ox3, no apparent distress, no deformities  Neck: No masses, normal thyroid  Lungs: normal inspiration, no use of accessory muscles  Heart: normal pulse, no arrhythmias  Abdomen: Soft, NT, ND, no masses, no hernias, no hepatosplenomegaly  Lymphatic: Neck and groin nodes negative    Labs/Studies:   See HPI    Impression/Plan:   Overactive bladder  Patient is extremely satisfied with current plan of care.  Has a complete resolution to all overactive bladder symptoms.  Nocturia is once nightly, sometimes none all.  Patient no longer wearing a pad.  Denies adverse side effects to VESIcare.  We will see her back in the clinic in 6 months for re-evaluation.  If still stable, we can see her annually for med refills.

## 2022-11-01 NOTE — PLAN OF CARE
"[facility]   Discharge Note    Name: Diana Meneses  Clinic Number: [id number]    Therapy Diagnosis:   Encounter Diagnosis   Name Primary?    Pelvic floor dysfunction in female Yes     Physician: Leonila Schwartz NP      Physician Orders: PT Eval and Treat   Medical Diagnosis from Referral:   N32.81 (ICD-10-CM) - OAB (overactive bladder)   N39.3 (ICD-10-CM) - Stress incontinence of urine   R35.1 (ICD-10-CM) - Nocturia      Evaluation Date: 10/3/2022    Date of Last visit: 10/31/2022  Total Visits Received: 3    ASSESSMENT      Patient presents reporting complete resolution of all of her symptoms. Patient reports being ready to discharge from therapy. Patient  has met of of her goals and has no farther need for pelvic floor therapy at this time.     Discharge reason: Patient is now asymptomatic, Patient has met all of his/her goals, and Patient requested discharge    Goals: Short Term Goals: 4 weeks   Pt to perform "the knack" prior to coughing, laughing or sneezing to decrease risk of incontinence. MET 10/26/2022  Pt to report a decrease in pad usage to no more than 1 a day to demonstrate improving pelvic floor function needed for continence. MET 10/26/2022  Pt to voice understanding of the role that diet plays on urinary urgency.  MET 10/26/2022  Pt to demonstrate proper diaphragmatic breathing to help with calming the nervous system in order to decrease pain and to improve abdominal wall musculature extensibility. MET 10/26/2022  Pt to demonstrate good body mechanics when performing ADLs such as lifting and bending to decrease strain to lumbopelvic structures and reduce risk of further injury. MET 11/1/2022  Pt to report minimal pain with palpation of abdominal wall due to improvement in soft tissue mobility from moderate to minimal restrictions. MET 11/1/2022     Long Term Goals: 12 weeks   Pt to be discharged with home plan for carry over after discharge.  MET 11/1/2022  Pt to report a " decrease in pad usage to 0 pads a day to demonstrate improving pelvic floor muscle controls as evidenced by decreased episodes of incontinence needed to improve confidence in social situations. MET 10/26/2022  Pt will be trained and compliant with postural strategies in sitting and standing to improve alignment and decrease pain and muscle fatigueMET 11/1/2022  Pt to report being able to have a comfortable vaginal exam without significant increase in pelvic pain. Did not assess  Pt to report being able to hold urine for at least 10 without reporting a significant increase in pain to demonstrate a return towards prior level of function. MET 10/26/2022    PLAN   This patient is discharged from Physical Therapy      Cora Barrera, PT, DPT, PPCES

## 2023-05-01 ENCOUNTER — OFFICE VISIT (OUTPATIENT)
Dept: UROLOGY | Facility: CLINIC | Age: 48
End: 2023-05-01
Payer: OTHER GOVERNMENT

## 2023-05-01 VITALS — SYSTOLIC BLOOD PRESSURE: 125 MMHG | DIASTOLIC BLOOD PRESSURE: 79 MMHG | HEART RATE: 84 BPM

## 2023-05-01 DIAGNOSIS — D21.9 FIBROIDS: Primary | ICD-10-CM

## 2023-05-01 PROCEDURE — 99999 PR PBB SHADOW E&M-EST. PATIENT-LVL III: ICD-10-PCS | Mod: PBBFAC,,, | Performed by: NURSE PRACTITIONER

## 2023-05-01 PROCEDURE — 99999 PR PBB SHADOW E&M-EST. PATIENT-LVL III: CPT | Mod: PBBFAC,,, | Performed by: NURSE PRACTITIONER

## 2023-05-01 PROCEDURE — 99213 OFFICE O/P EST LOW 20 MIN: CPT | Mod: PBBFAC | Performed by: NURSE PRACTITIONER

## 2023-05-01 PROCEDURE — 99214 OFFICE O/P EST MOD 30 MIN: CPT | Mod: S$PBB,,, | Performed by: NURSE PRACTITIONER

## 2023-05-01 PROCEDURE — 99214 PR OFFICE/OUTPT VISIT, EST, LEVL IV, 30-39 MIN: ICD-10-PCS | Mod: S$PBB,,, | Performed by: NURSE PRACTITIONER

## 2023-05-01 RX ORDER — MIRABEGRON 50 MG/1
50 TABLET, FILM COATED, EXTENDED RELEASE ORAL DAILY
Qty: 30 TABLET | Refills: 11 | Status: SHIPPED | OUTPATIENT
Start: 2023-05-01 | End: 2024-04-30

## 2023-05-01 NOTE — PROGRESS NOTES
Chief Complaint:   OAB    HPI:   Patient is a 47-year-old female that is presenting as a follow-up to overactive bladder.  At her last visit she stated that she had a complete resolution to all overactive bladder symptoms on VESIcare and after completion of PT pelvic floor training.  Patient states that she is now wearing a pad that she changes several times a day and is concerned that VESIcare is not resolving her overactive bladder symptoms.  Patient states that [facility] with a [facility] fib[facility] and is concerned that this is causing her overactive bladder symptoms.  Urine in clinic is negative and PVR is 14 mL.  Denies gross hematuria.  11/01/2022  Patient is a 47-year-old female that is presenting as a follow-up to VESIcare and pelvic floor training.  Patient states that she has had a complete resolution to all overactive bladder symptoms.  Is currently on VESIcare 10 mg once daily, denies adverse side effects.  Urine in clinic is negative.  PVR is 3 mL.  Patient is extremely satisfied with PT pelvic floor training and wants to complete her sessions.  09/01/2022  Patient is a 47-year-old female that is presenting with an increase in stress incontinence and daytime frequency.  Patient states that nocturia twice nightly.  She is currently on oxybutynin 10 mg once daily and states that medication is not decreasing her overactive bladder symptoms.  Urine in clinic is negative.  PVR was 2 mL.  Unfortunately, patient does drink caffeinated drinks throughout the day and smokes.No abd/pelvic pain and no exac/rel factors.  No hematuria.  No urolithiasis    Allergies:  Penicillins    Medications:  has a current medication list which includes the following prescription(s): acyclovir, alprazolam, azithromycin, ibuprofen, nicotine, nicotine (polacrilex), and solifenacin.    Review of Systems:  General: No fever, chills, fatigability, or weight loss.  Skin: No rashes, itching, or changes in color or  texture of skin.  Chest: Denies PATRICIO, cyanosis, wheezing, cough, and sputum production.  Abdomen: Appetite fine. No weight loss. Denies diarrhea, abdominal pain, hematemesis, or blood in stool.  Musculoskeletal: No joint stiffness or swelling. Denies back pain.  : As above.  All other review of systems negative.    PMH:   has a past medical history of Adjustment disorder with mixed anxiety and depressed mood, Herpes labialis, and HSV-2 (herpes simplex virus 2) infection.    PSH:   has a past surgical history that includes Tubal ligation and Mouth surgery (02/2014).    FamHx: family history includes Breast cancer in her mother; COPD in her father; Cancer in her maternal aunt; Heart disease in her father; Hypertension in her father; Liver cancer in her maternal uncle; Lung cancer in her maternal grandfather; Stroke in her father.    SocHx:  reports that she has been smoking cigarettes. She has been smoking an average of .5 packs per day. She does not have any smokeless tobacco history on file. She reports that she does not drink alcohol and does not use drugs.      Physical Exam:  Vitals:    05/01/23 1438   BP: 125/79   Pulse: 84     General:  Extremely anxious female, A&Ox3, no apparent distress, no deformities  Neck: No masses, normal thyroid  Lungs: normal inspiration, no use of accessory muscles  Heart: normal pulse, no arrhythmias  Abdomen: Soft, NT, ND, no masses, no hernias, no hepatosplenomegaly  Lymphatic: Neck and groin nodes negative  Skin: The skin is warm and dry. No jaundice.    Labs/Studies:   Unable to give urine sample    Impression/Plan:   1. Uterine fibroid  Patient is extremely anxious secondary to diagnosis of uterine fibroid.  A referral to gyn physician was placed.    2. Overactive bladder  Patient to discontinue VESIcare, see HPI.  Was scheduled with Dr. Swain for possible Botox or InterStim.  Patient requesting another medication, [facility]riq [facility].

## 2023-05-01 NOTE — LETTER
May 1, 2023      [facility]  [id number] [place] [id number]  Phone: [phone]  Fax: [phone]       Patient: Diana Meneses   YOB: 1975  Date of Visit: 05/01/2023    To Whom It May Concern:    Jacky Meneses  was at [facility] on 05/01/2023. The patient may return to work/school on 5/2/23 with no restrictions. If you have any questions or concerns, or if I can be of further assistance, please do not hesitate to contact me.    Sincerely,    Benja Vincent MA

## 2023-05-02 ENCOUNTER — TELEPHONE (OUTPATIENT)
Dept: UROLOGY | Facility: CLINIC | Age: 48
End: 2023-05-02
Payer: OTHER GOVERNMENT

## 2023-05-02 NOTE — TELEPHONE ENCOUNTER
Forms printed out for Leonila to sign will scan back into chart.    ----- Message from Brandee Clemons sent at 2023  5:44 PM CDT -----  The had an appt with Leonila Rushing on today 23 but he auth is , I have completed a RFS form and scanned in  to be signed by Leonila, so [facility] can [facility] for [facility], thanks

## 2023-06-02 ENCOUNTER — TELEPHONE (OUTPATIENT)
Dept: OBSTETRICS AND GYNECOLOGY | Facility: CLINIC | Age: 48
End: 2023-06-02
Payer: OTHER GOVERNMENT

## 2023-06-02 NOTE — TELEPHONE ENCOUNTER
Referral from Leonila Schwartz NP noted in workque for fibroids. Pt possibly also need wwe. No answer, left vm to return call. Attempt #1.

## 2023-06-05 ENCOUNTER — HOSPITAL ENCOUNTER (OUTPATIENT)
Dept: RADIOLOGY | Facility: HOSPITAL | Age: 48
Discharge: HOME | End: 2023-06-05
Attending: INTERNAL MEDICINE
Payer: OTHER GOVERNMENT

## 2023-06-05 ENCOUNTER — OFFICE VISIT (OUTPATIENT)
Dept: PULMONOLOGY | Facility: CLINIC | Age: 48
End: 2023-06-05
Payer: OTHER GOVERNMENT

## 2023-06-05 VITALS
OXYGEN SATURATION: 97 % | WEIGHT: 207.31 LBS | HEART RATE: 83 BPM | SYSTOLIC BLOOD PRESSURE: 126 MMHG | BODY MASS INDEX: 30.7 KG/M2 | HEIGHT: 69 IN | DIASTOLIC BLOOD PRESSURE: 78 MMHG | RESPIRATION RATE: 17 BRPM

## 2023-06-05 DIAGNOSIS — J44.9 COPD SUGGESTED BY INITIAL EVALUATION: ICD-10-CM

## 2023-06-05 DIAGNOSIS — J43.9 PULMONARY EMPHYSEMA, UNSPECIFIED EMPHYSEMA TYPE: ICD-10-CM

## 2023-06-05 DIAGNOSIS — F17.210 SMOKING GREATER THAN 30 PACK YEARS: ICD-10-CM

## 2023-06-05 DIAGNOSIS — F17.210 TOBACCO DEPENDENCE DUE TO CIGARETTES: ICD-10-CM

## 2023-06-05 DIAGNOSIS — J44.9 COPD SUGGESTED BY INITIAL EVALUATION: Primary | ICD-10-CM

## 2023-06-05 PROCEDURE — 99999 PR PBB SHADOW E&M-EST. PATIENT-LVL IV: CPT | Mod: PBBFAC,,, | Performed by: INTERNAL MEDICINE

## 2023-06-05 PROCEDURE — 99214 OFFICE O/P EST MOD 30 MIN: CPT | Mod: PBBFAC,25 | Performed by: INTERNAL MEDICINE

## 2023-06-05 PROCEDURE — 99204 PR OFFICE/OUTPT VISIT, NEW, LEVL IV, 45-59 MIN: ICD-10-PCS | Mod: S$PBB,,, | Performed by: INTERNAL MEDICINE

## 2023-06-05 PROCEDURE — 71046 X-RAY EXAM CHEST 2 VIEWS: CPT | Mod: TC

## 2023-06-05 PROCEDURE — 99999 PR PBB SHADOW E&M-EST. PATIENT-LVL IV: ICD-10-PCS | Mod: PBBFAC,,, | Performed by: INTERNAL MEDICINE

## 2023-06-05 PROCEDURE — 71046 X-RAY EXAM CHEST 2 VIEWS: CPT | Mod: 26,,, | Performed by: STUDENT IN AN ORGANIZED HEALTH CARE EDUCATION/TRAINING PROGRAM

## 2023-06-05 PROCEDURE — 71046 XR CHEST PA AND LATERAL: ICD-10-PCS | Mod: 26,,, | Performed by: STUDENT IN AN ORGANIZED HEALTH CARE EDUCATION/TRAINING PROGRAM

## 2023-06-05 PROCEDURE — 99204 OFFICE O/P NEW MOD 45 MIN: CPT | Mod: S$PBB,,, | Performed by: INTERNAL MEDICINE

## 2023-06-05 RX ORDER — UMECLIDINIUM BROMIDE AND VILANTEROL TRIFENATATE 62.5; 25 UG/1; UG/1
1 POWDER RESPIRATORY (INHALATION) DAILY
Qty: 60 EACH | Refills: 5 | Status: SHIPPED | OUTPATIENT
Start: 2023-06-05 | End: 2023-06-30 | Stop reason: CLARIF

## 2023-06-05 RX ORDER — FERROUS SULFATE 325(65) MG
325 TABLET ORAL
COMMUNITY
Start: 2022-07-27

## 2023-06-05 RX ORDER — GLIPIZIDE 10 MG/1
10 TABLET ORAL
COMMUNITY
Start: 2023-04-20

## 2023-06-05 RX ORDER — ALBUTEROL SULFATE 90 UG/1
2 AEROSOL, METERED RESPIRATORY (INHALATION) EVERY 6 HOURS PRN
Qty: 18 G | Refills: 5 | Status: SHIPPED | OUTPATIENT
Start: 2023-06-05 | End: 2024-02-12 | Stop reason: SDUPTHER

## 2023-06-05 RX ORDER — ROSUVASTATIN CALCIUM 5 MG/1
1 TABLET, COATED ORAL DAILY
COMMUNITY
Start: 2022-07-27

## 2023-06-05 RX ORDER — LANOLIN ALCOHOL/MO/W.PET/CERES
1000 CREAM (GRAM) TOPICAL
COMMUNITY
Start: 2022-07-27

## 2023-06-05 RX ORDER — UMECLIDINIUM BROMIDE AND VILANTEROL TRIFENATATE 62.5; 25 UG/1; UG/1
1 POWDER RESPIRATORY (INHALATION) DAILY
Qty: 1 EACH | Refills: 5 | Status: SHIPPED | OUTPATIENT
Start: 2023-06-05 | End: 2023-06-05

## 2023-06-05 RX ORDER — METFORMIN HYDROCHLORIDE 750 MG/1
750 TABLET, EXTENDED RELEASE ORAL
COMMUNITY
Start: 2023-01-04

## 2023-06-05 RX ORDER — CHOLECALCIFEROL (VITAMIN D3) 25 MCG
25 TABLET ORAL
COMMUNITY
Start: 2022-10-17

## 2023-06-05 NOTE — PROGRESS NOTES
Initial Outpatient Pulmonary Evaluation       SUBJECTIVE:     Chief Complaint   Patient presents with    COPD       History of Present Illness:    Patient is a 47 y.o. female complaining of coughing wheezing SOB [facility] a [facility] and was told she has mild asthma.      Not on inhalers.      1 PPD x 30 failed smoking cessation with multiple pharmacotherapy.      Disabled .      Review of Systems   Constitutional:  Positive for fatigue. Negative for fever and chills.   HENT:  Negative for nosebleeds.    Eyes:  Negative for redness.   Respiratory:  Positive for cough and shortness of breath. Negative for choking.    Cardiovascular:  Negative for chest pain.   Genitourinary:  Negative for hematuria.   Endocrine:  Negative for cold intolerance.    Musculoskeletal:  Positive for arthralgias.   Gastrointestinal:  Negative for vomiting.   Neurological:  Negative for syncope.   Hematological:  Negative for adenopathy.   Psychiatric/Behavioral:  Negative for confusion.      Review of patient's allergies indicates:   Allergen Reactions    Penicillins Other (See Comments)     unknown       Current Outpatient Medications   Medication Sig Dispense Refill    acyclovir (ZOVIRAX) 400 MG tablet Take 1 tablet (400 mg total) by mouth 2 (two) times daily. 180 tablet 2    cyanocobalamin (VITAMIN B-12) 1000 MCG tablet 1,000 mcg.      ferrous sulfate (FEOSOL) 325 mg (65 mg iron) Tab tablet 325 mg.      glipiZIDE (GLUCOTROL) 10 MG tablet 10 mg.      ibuprofen (ADVIL,MOTRIN) 800 MG tablet Take 1 tablet (800 mg total) by mouth 3 (three) times daily. 60 tablet 0    metFORMIN (GLUCOPHAGE-XR) 750 MG ER 24hr tablet 750 mg.      mirabegron (MYRBETRIQ) 50 mg Tb24 Take 1 tablet (50 mg total) by mouth once daily. 30 tablet 11    rosuvastatin (CRESTOR) 5 MG tablet Take 1 tablet by mouth once daily.      solifenacin (VESICARE) 10 MG tablet Take 1 tablet (10 mg total) by mouth once  "daily. 30 tablet 11    vitamin D (VITAMIN D3) 1000 units Tab 25 mcg.      albuterol (PROVENTIL/VENTOLIN HFA) 90 mcg/actuation inhaler Inhale 2 puffs into the lungs every 6 (six) hours as needed for Wheezing. Rescue 18 g 5    alprazolam (XANAX) 0.5 MG tablet Take 1 tablet (0.5 mg total) by mouth 2 (two) times daily. (Patient not taking: Reported on 6/5/2023) 60 tablet 0    nicotine (NICODERM CQ) 21 mg/24 hr Place 1 patch onto the skin once daily. (Patient not taking: Reported on 6/5/2023) 28 patch 1    nicotine, polacrilex, (NICORETTE) 2 mg Gum Take 1 each (2 mg total) by mouth as needed. (Patient not taking: Reported on 6/5/2023) 100 each 0    umeclidinium-vilanteroL (ANORO ELLIPTA) 62.5-25 mcg/actuation DsDv Inhale 1 puff into the lungs once daily. Controller 1 each 5     No current facility-administered medications for this visit.       Past Medical History:   Diagnosis Date    Adjustment disorder with mixed anxiety and depressed mood     Herpes labialis     HSV-2 (herpes simplex virus 2) infection      Past Surgical History:   Procedure Laterality Date    MOUTH SURGERY  02/2014    TUBAL LIGATION       Family History   Problem Relation Age of Onset    Breast cancer Mother     COPD Father     Hypertension Father     Stroke Father     Heart disease Father     Cancer Maternal Aunt     Liver cancer Maternal Uncle     Lung cancer Maternal Grandfather      Social History     Tobacco Use    Smoking status: Every Day     Packs/day: 0.50     Types: Cigarettes   Substance Use Topics    Alcohol use: No     Comment: Socially    Drug use: No          OBJECTIVE:     Vital Signs (Most Recent)  Vital Signs  Pulse: 83  Resp: 17  SpO2: 97 %  BP: 126/78  Height and Weight  Height: 5' 9" (175.3 cm)  Weight: 94 kg (207 lb 5.5 oz)  BSA (Calculated - sq m): 2.14 sq meters  BMI (Calculated): 30.6  Weight in (lb) to have BMI = 25: 168.9]  Wt Readings from Last 2 Encounters:   06/05/23 94 kg (207 lb 5.5 oz)   11/01/22 86.2 kg (190 lb) "         Physical Exam:  Physical Exam   Constitutional: She is oriented to person, place, and time. She appears well-developed and well-nourished. No distress.   HENT:   Head: Normocephalic.   Cardiovascular: Normal rate, regular rhythm and normal heart sounds.   Pulmonary/Chest: Normal expansion and effort normal. No stridor. No respiratory distress. She has decreased breath sounds. She exhibits no tenderness.   Abdominal: She exhibits no distension.   Musculoskeletal:         General: No tenderness.      Cervical back: Neck supple.   Lymphadenopathy:     She has no cervical adenopathy.   Neurological: She is alert and oriented to person, place, and time. Gait normal.   Skin: Skin is warm. No cyanosis. Nails show no clubbing.   Psychiatric: She has a normal mood and affect. Her behavior is normal. Judgment and thought content normal.   Nursing note and vitals reviewed.    Laboratory  No results found for: WBC, RBC, HGB, HCT, MCV, MCH, MCHC, RDW, PLT, MPV, GRAN, LYMPH, MONO, EOS, BASO, EOSINOPHIL, BASOPHIL    BMP  Lab Results   Component Value Date    CREATININE 0.7 09/01/2022       No results found for: BNP    No results found for: TSH    No results found for: SEDRATE    No results found for: CRP    No results found for: IGE    No results found for: ASPERGILLUS  No results found for: AFUMIGATUSCL     No results found for: ACE    Diagnostic Results:  I have personally reviewed today the following studies :        ASSESSMENT/PLAN:     COPD suggested by initial evaluation  -     albuterol (PROVENTIL/VENTOLIN HFA) 90 mcg/actuation inhaler; Inhale 2 puffs into the lungs every 6 (six) hours as needed for Wheezing. Rescue  Dispense: 18 g; Refill: 5  -     Discontinue: umeclidinium-vilanteroL (ANORO ELLIPTA) 62.5-25 mcg/actuation DsDv; Inhale 1 puff into the lungs once daily. Controller  Dispense: 1 each; Refill: 5  -     Complete PFT with bronchodilator; Future; Expected date: 06/19/2023  -     X-Ray Chest PA And Lateral;  Future; Expected date: 06/05/2023  -     umeclidinium-vilanteroL (ANORO ELLIPTA) 62.5-25 mcg/actuation DsDv; Inhale 1 puff into the lungs once daily. Controller  Dispense: 1 each; Refill: 5    Pulmonary emphysema, unspecified emphysema type  -     albuterol (PROVENTIL/VENTOLIN HFA) 90 mcg/actuation inhaler; Inhale 2 puffs into the lungs every 6 (six) hours as needed for Wheezing. Rescue  Dispense: 18 g; Refill: 5  -     Complete PFT with bronchodilator; Future; Expected date: 06/19/2023  -     X-Ray Chest PA And Lateral; Future; Expected date: 06/05/2023  -     umeclidinium-vilanteroL (ANORO ELLIPTA) 62.5-25 mcg/actuation DsDv; Inhale 1 puff into the lungs once daily. Controller  Dispense: 1 each; Refill: 5    Tobacco dependence due to cigarettes    Smoking greater than 30 pack years        Albuterol p.r.n. start Anoro 1 puff daily.      Counseled about smoking cessation.  Willing to make effort to quit smoking.      Check chest x-ray PFT and 6 minute walking test.      Declined Prevnar 20.      June 2024 will qualify for low-dose screening CT chest based on her more than 30 pack year smoking history age 48.      Additional recommendation to follow above workup  Follow up in about 3 months (around 9/5/2023).    This note was prepared using voice recognition system and is likely to have sound alike errors that may have been overlooked even after proof reading.  Please call me with any questions    Discussed diagnosis, its evaluation, treatment and usual course. All questions answered.    Thank you for the courtesy of participating in the care of this patient    Chato Feliz MD

## 2023-06-09 ENCOUNTER — TELEPHONE (OUTPATIENT)
Dept: OBSTETRICS AND GYNECOLOGY | Facility: CLINIC | Age: 48
End: 2023-06-09
Payer: OTHER GOVERNMENT

## 2023-06-09 NOTE — TELEPHONE ENCOUNTER
Referral from Leonila Schwartz NP noted in workque for fibroids. Pt possibly also need wwe. Pt advised she is [facility], noted they just called her today and stated once approved the [facility] will directly contact our office to schedule.

## 2023-06-22 ENCOUNTER — OFFICE VISIT (OUTPATIENT)
Dept: OBSTETRICS AND GYNECOLOGY | Facility: CLINIC | Age: 48
End: 2023-06-22
Payer: OTHER GOVERNMENT

## 2023-06-22 VITALS
SYSTOLIC BLOOD PRESSURE: 124 MMHG | WEIGHT: 208.31 LBS | DIASTOLIC BLOOD PRESSURE: 82 MMHG | BODY MASS INDEX: 30.77 KG/M2

## 2023-06-22 DIAGNOSIS — Z12.4 PAPANICOLAOU SMEAR FOR CERVICAL CANCER SCREENING: ICD-10-CM

## 2023-06-22 DIAGNOSIS — Z86.018 HISTORY OF UTERINE FIBROID: Primary | ICD-10-CM

## 2023-06-22 DIAGNOSIS — D21.9 FIBROIDS: ICD-10-CM

## 2023-06-22 PROCEDURE — 99386 PR PREVENTIVE VISIT,NEW,40-64: ICD-10-PCS | Mod: S$PBB,,, | Performed by: NURSE PRACTITIONER

## 2023-06-22 PROCEDURE — 99213 OFFICE O/P EST LOW 20 MIN: CPT | Mod: PBBFAC | Performed by: NURSE PRACTITIONER

## 2023-06-22 PROCEDURE — 99999 PR PBB SHADOW E&M-EST. PATIENT-LVL III: ICD-10-PCS | Mod: PBBFAC,,, | Performed by: NURSE PRACTITIONER

## 2023-06-22 PROCEDURE — 88175 CYTOPATH C/V AUTO FLUID REDO: CPT | Performed by: NURSE PRACTITIONER

## 2023-06-22 PROCEDURE — 99386 PREV VISIT NEW AGE 40-64: CPT | Mod: S$PBB,,, | Performed by: NURSE PRACTITIONER

## 2023-06-22 PROCEDURE — 87624 HPV HI-RISK TYP POOLED RSLT: CPT | Performed by: NURSE PRACTITIONER

## 2023-06-22 PROCEDURE — 99999 PR PBB SHADOW E&M-EST. PATIENT-LVL III: CPT | Mod: PBBFAC,,, | Performed by: NURSE PRACTITIONER

## 2023-06-22 NOTE — PROGRESS NOTES
Subjective:       Patient ID: Diana Meneses is a 47 y.o. female.    Chief Complaint:  Fibroids    No LMP recorded.  History of Present Illness    Referred from Leonila Schwartz urology   States that Leonila saw the size of her uterus (unsure where) and recommended she see GYN due to urology thinks that her fibroids is affecting her bladder.  Desires pap smear   She has not had a cycle in over a year     OB History    Para Term  AB Living   2 2           SAB IAB Ectopic Multiple Live Births                  # Outcome Date GA Lbr [place]/2nd Weight Sex Delivery Anes PTL Lv   2 Para      Vag-Spont      1 Para      Vag-Spont          Review of Systems  Review of Systems        Objective:    Physical Exam  Genitourinary:     General: Normal vulva.      Vagina: No signs of injury and foreign body. No vaginal discharge, erythema, tenderness, bleeding, lesions or prolapsed vaginal walls.      Cervix: No cervical motion tenderness, discharge, friability, lesion, erythema, cervical bleeding or eversion.      Uterus: Not deviated, not enlarged, not fixed, not tender and no uterine prolapse.       Adnexa:         Right: No mass, tenderness or fullness.          Left: No mass, tenderness or fullness.           Assessment:     1. History of uterine fibroid    2. Papanicolaou smear for cervical cancer screening              Plan:   Diana was seen today for fibroids.    Diagnoses and all orders for this visit:    History of uterine fibroid  -     US Pelvis Complete Non OB; Future    Papanicolaou smear for cervical cancer screening  -     Liquid-Based Pap Smear, Screening  -     HPV High Risk Genotypes, PCR      Return to clinic PRN

## 2023-06-27 LAB
FINAL PATHOLOGIC DIAGNOSIS: NORMAL
HPV HR 12 DNA SPEC QL NAA+PROBE: NEGATIVE
HPV16 AG SPEC QL: NEGATIVE
HPV18 DNA SPEC QL NAA+PROBE: NEGATIVE
Lab: NORMAL

## 2023-06-29 ENCOUNTER — CLINICAL SUPPORT (OUTPATIENT)
Dept: PULMONOLOGY | Facility: CLINIC | Age: 48
End: 2023-06-29
Payer: OTHER GOVERNMENT

## 2023-06-29 DIAGNOSIS — J44.9 COPD SUGGESTED BY INITIAL EVALUATION: ICD-10-CM

## 2023-06-29 DIAGNOSIS — J43.9 PULMONARY EMPHYSEMA, UNSPECIFIED EMPHYSEMA TYPE: ICD-10-CM

## 2023-06-29 LAB
BRPFT: ABNORMAL
DLCO ADJ PRE: 19.52 ML/(MIN*MMHG) (ref 21.51–32.98)
DLCO SINGLE BREATH LLN: 21.51
DLCO SINGLE BREATH PRE REF: 71.7 %
DLCO SINGLE BREATH REF: 27.25
DLCOC SBVA LLN: 3.44
DLCOC SBVA PRE REF: 92.1 %
DLCOC SBVA REF: 4.73
DLCOC SINGLE BREATH LLN: 21.51
DLCOC SINGLE BREATH PRE REF: 71.7 %
DLCOC SINGLE BREATH REF: 27.25
DLCOVA LLN: 3.44
DLCOVA PRE REF: 92.1 %
DLCOVA PRE: 4.36 ML/(MIN*MMHG*L) (ref 3.44–6.02)
DLCOVA REF: 4.73
DLVAADJ PRE: 4.36 ML/(MIN*MMHG*L) (ref 3.44–6.02)
ERV LLN: -16449
ERV PRE REF: 13.5 %
ERV REF: 1
FEF 25 75 CHG: 30.2 %
FEF 25 75 LLN: 1.7
FEF 25 75 POST REF: 126.7 %
FEF 25 75 PRE REF: 97.3 %
FEF 25 75 REF: 3.03
FET100 CHG: 21.2 %
FEV1 CHG: 5.2 %
FEV1 FVC CHG: 4.5 %
FEV1 FVC LLN: 69
FEV1 FVC POST REF: 106.8 %
FEV1 FVC PRE REF: 102.2 %
FEV1 FVC REF: 80
FEV1 LLN: 2.52
FEV1 POST REF: 84.4 %
FEV1 PRE REF: 80.2 %
FEV1 REF: 3.23
FRCPLETH LLN: 2.15
FRCPLETH PREREF: 96.5 %
FRCPLETH REF: 2.97
FVC CHG: 0.6 %
FVC LLN: 3.19
FVC POST REF: 78.4 %
FVC PRE REF: 77.9 %
FVC REF: 4.08
IVC PRE: 3.05 L (ref 3.19–4.97)
IVC SINGLE BREATH LLN: 3.19
IVC SINGLE BREATH PRE REF: 74.7 %
IVC SINGLE BREATH REF: 4.08
MVV LLN: 104
MVV PRE REF: 71 %
MVV REF: 123
PEF CHG: 55.5 %
PEF LLN: 5.53
PEF POST REF: 85 %
PEF PRE REF: 54.7 %
PEF REF: 7.52
POST FEF 25 75: 3.84 L/S (ref 1.7–4.35)
POST FET 100: 7.7 SEC
POST FEV1 FVC: 85.33 % (ref 68.85–90.96)
POST FEV1: 2.73 L (ref 2.52–3.95)
POST FVC: 3.2 L (ref 3.19–4.97)
POST PEF: 6.39 L/S (ref 5.53–9.5)
PRE DLCO: 19.52 ML/(MIN*MMHG) (ref 21.51–32.98)
PRE ERV: 0.14 L (ref -16449–16451)
PRE FEF 25 75: 2.95 L/S (ref 1.7–4.35)
PRE FET 100: 6.35 SEC
PRE FEV1 FVC: 81.63 % (ref 68.85–90.96)
PRE FEV1: 2.6 L (ref 2.52–3.95)
PRE FRC PL: 2.87 L
PRE FVC: 3.18 L (ref 3.19–4.97)
PRE MVV: 87 L/MIN (ref 104.13–140.88)
PRE PEF: 4.11 L/S (ref 5.53–9.5)
PRE RV: 2.42 L (ref 1.39–2.54)
PRE TLC: 5.6 L (ref 4.77–6.75)
RAW LLN: 3.06
RAW PRE REF: 152.3 %
RAW PRE: 4.66 CMH2O*S/L (ref 3.06–3.06)
RAW REF: 3.06
RV LLN: 1.39
RV PRE REF: 122.8 %
RV REF: 1.97
RVTLC LLN: 26
RVTLC PRE REF: 120.1 %
RVTLC PRE: 43.19 % (ref 26.37–45.55)
RVTLC REF: 36
TLC LLN: 4.77
TLC PRE REF: 97.2 %
TLC REF: 5.76
VA PRE: 4.48 L (ref 5.61–5.61)
VA SINGLE BREATH LLN: 5.61
VA SINGLE BREATH PRE REF: 79.9 %
VA SINGLE BREATH REF: 5.61
VC LLN: 3.19
VC PRE REF: 77.9 %
VC PRE: 3.18 L (ref 3.19–4.97)
VC REF: 4.08
VTGRAWPRE: 2.71 L

## 2023-06-29 PROCEDURE — 94060 PR EVAL OF BRONCHOSPASM: ICD-10-PCS | Mod: 26,S$PBB,, | Performed by: INTERNAL MEDICINE

## 2023-06-29 PROCEDURE — 94726 PULM FUNCT TST PLETHYSMOGRAP: ICD-10-PCS | Mod: 26,S$PBB,, | Performed by: INTERNAL MEDICINE

## 2023-06-29 PROCEDURE — 94729 DIFFUSING CAPACITY: CPT | Mod: PBBFAC

## 2023-06-29 PROCEDURE — 94726 PLETHYSMOGRAPHY LUNG VOLUMES: CPT | Mod: 26,S$PBB,, | Performed by: INTERNAL MEDICINE

## 2023-06-29 PROCEDURE — 94729 DIFFUSING CAPACITY: CPT | Mod: 26,S$PBB,, | Performed by: INTERNAL MEDICINE

## 2023-06-29 PROCEDURE — 94726 PLETHYSMOGRAPHY LUNG VOLUMES: CPT | Mod: PBBFAC

## 2023-06-29 PROCEDURE — 99211 OFF/OP EST MAY X REQ PHY/QHP: CPT | Mod: PBBFAC

## 2023-06-29 PROCEDURE — 94060 EVALUATION OF WHEEZING: CPT | Mod: PBBFAC

## 2023-06-29 PROCEDURE — 94060 EVALUATION OF WHEEZING: CPT | Mod: 26,S$PBB,, | Performed by: INTERNAL MEDICINE

## 2023-06-29 PROCEDURE — 94729 PR C02/MEMBANE DIFFUSE CAPACITY: ICD-10-PCS | Mod: 26,S$PBB,, | Performed by: INTERNAL MEDICINE

## 2023-06-29 PROCEDURE — 99999 PR PBB SHADOW E&M-EST. PATIENT-LVL I: CPT | Mod: PBBFAC,,,

## 2023-06-29 PROCEDURE — 99999 PR PBB SHADOW E&M-EST. PATIENT-LVL I: ICD-10-PCS | Mod: PBBFAC,,,

## 2023-06-30 ENCOUNTER — HOSPITAL ENCOUNTER (OUTPATIENT)
Dept: RADIOLOGY | Facility: HOSPITAL | Age: 48
Discharge: HOME | End: 2023-06-30
Attending: NURSE PRACTITIONER
Payer: OTHER GOVERNMENT

## 2023-06-30 ENCOUNTER — TELEPHONE (OUTPATIENT)
Dept: PULMONOLOGY | Facility: CLINIC | Age: 48
End: 2023-06-30
Payer: OTHER GOVERNMENT

## 2023-06-30 DIAGNOSIS — Z86.018 HISTORY OF UTERINE FIBROID: ICD-10-CM

## 2023-06-30 DIAGNOSIS — J43.9 PULMONARY EMPHYSEMA, UNSPECIFIED EMPHYSEMA TYPE: ICD-10-CM

## 2023-06-30 DIAGNOSIS — J43.9 PULMONARY EMPHYSEMA, UNSPECIFIED EMPHYSEMA TYPE: Primary | ICD-10-CM

## 2023-06-30 PROCEDURE — 76856 US EXAM PELVIC COMPLETE: CPT | Mod: 26,,, | Performed by: RADIOLOGY

## 2023-06-30 PROCEDURE — 76856 US PELVIS COMP WITH TRANSVAG NON-OB (XPD): ICD-10-PCS | Mod: 26,,, | Performed by: RADIOLOGY

## 2023-06-30 PROCEDURE — 76830 US PELVIS COMP WITH TRANSVAG NON-OB (XPD): ICD-10-PCS | Mod: 26,,, | Performed by: RADIOLOGY

## 2023-06-30 PROCEDURE — 76830 TRANSVAGINAL US NON-OB: CPT | Mod: 26,,, | Performed by: RADIOLOGY

## 2023-06-30 PROCEDURE — 76856 US EXAM PELVIC COMPLETE: CPT | Mod: TC

## 2023-06-30 NOTE — TELEPHONE ENCOUNTER
----- Message from Li Poole sent at 6/29/2023  3:25 PM CDT -----  Contact: Diana Vallejo needs a letter stating she attended her appt today 6/29. She can pick it up on 6/30. Please call her back at [phone].    Thanks  TS

## 2023-06-30 NOTE — TELEPHONE ENCOUNTER
Initial Outpatient Pulmonary Evaluation       SUBJECTIVE:     Chief Complaint   Patient presents with    COPD       History of Present Illness:    Patient is a 47 y.o. female complaining of coughing wheezing SOB [facility] and was told she has mild asthma.      Not on inhalers.      1 PPD x 30 failed smoking cessation with multiple pharmacotherapy.      Disabled .      Review of Systems   Constitutional:  Positive for fatigue. Negative for fever and chills.   HENT:  Negative for nosebleeds.    Eyes:  Negative for redness.   Respiratory:  Positive for cough and shortness of breath. Negative for choking.    Cardiovascular:  Negative for chest pain.   Genitourinary:  Negative for hematuria.   Endocrine:  Negative for cold intolerance.    Musculoskeletal:  Positive for arthralgias.   Gastrointestinal:  Negative for vomiting.   Neurological:  Negative for syncope.   Hematological:  Negative for adenopathy.   Psychiatric/Behavioral:  Negative for confusion.      Review of patient's allergies indicates:   Allergen Reactions    Penicillins Other (See Comments)     unknown       Current Outpatient Medications   Medication Sig Dispense Refill    acyclovir (ZOVIRAX) 400 MG tablet Take 1 tablet (400 mg total) by mouth 2 (two) times daily. 180 tablet 2    cyanocobalamin (VITAMIN B-12) 1000 MCG tablet 1,000 mcg.      ferrous sulfate (FEOSOL) 325 mg (65 mg iron) Tab tablet 325 mg.      glipiZIDE (GLUCOTROL) 10 MG tablet 10 mg.      ibuprofen (ADVIL,MOTRIN) 800 MG tablet Take 1 tablet (800 mg total) by mouth 3 (three) times daily. 60 tablet 0    metFORMIN (GLUCOPHAGE-XR) 750 MG ER 24hr tablet 750 mg.      mirabegron (MYRBETRIQ) 50 mg Tb24 Take 1 tablet (50 mg total) by mouth once daily. 30 tablet 11    rosuvastatin (CRESTOR) 5 MG tablet Take 1 tablet by mouth once daily.      solifenacin (VESICARE) 10 MG tablet Take 1 tablet (10 mg total) by mouth once  "daily. 30 tablet 11    vitamin D (VITAMIN D3) 1000 units Tab 25 mcg.      albuterol (PROVENTIL/VENTOLIN HFA) 90 mcg/actuation inhaler Inhale 2 puffs into the lungs every 6 (six) hours as needed for Wheezing. Rescue 18 g 5    alprazolam (XANAX) 0.5 MG tablet Take 1 tablet (0.5 mg total) by mouth 2 (two) times daily. (Patient not taking: Reported on 6/5/2023) 60 tablet 0    nicotine (NICODERM CQ) 21 mg/24 hr Place 1 patch onto the skin once daily. (Patient not taking: Reported on 6/5/2023) 28 patch 1    nicotine, polacrilex, (NICORETTE) 2 mg Gum Take 1 each (2 mg total) by mouth as needed. (Patient not taking: Reported on 6/5/2023) 100 each 0    umeclidinium-vilanteroL (ANORO ELLIPTA) 62.5-25 mcg/actuation DsDv Inhale 1 puff into the lungs once daily. Controller 1 each 5     No current facility-administered medications for this visit.       Past Medical History:   Diagnosis Date    Adjustment disorder with mixed anxiety and depressed mood     Herpes labialis     HSV-2 (herpes simplex virus 2) infection      Past Surgical History:   Procedure Laterality Date    MOUTH SURGERY  02/2014    TUBAL LIGATION       Family History   Problem Relation Age of Onset    Breast cancer Mother     COPD Father     Hypertension Father     Stroke Father     Heart disease Father     Cancer Maternal Aunt     Liver cancer Maternal Uncle     Lung cancer Maternal Grandfather      Social History     Tobacco Use    Smoking status: Every Day     Packs/day: 0.50     Types: Cigarettes   Substance Use Topics    Alcohol use: No     Comment: Socially    Drug use: No          OBJECTIVE:     Vital Signs (Most Recent)  Vital Signs  Pulse: 83  Resp: 17  SpO2: 97 %  BP: 126/78  Height and Weight  Height: 5' 9" (175.3 cm)  Weight: 94 kg (207 lb 5.5 oz)  BSA (Calculated - sq m): 2.14 sq meters  BMI (Calculated): 30.6  Weight in (lb) to have BMI = 25: 168.9]  Wt Readings from Last 2 Encounters:   06/05/23 94 kg (207 lb 5.5 oz)   11/01/22 86.2 kg (190 lb) "         Physical Exam:  Physical Exam   Constitutional: She is oriented to person, place, and time. She appears well-developed and well-nourished. No distress.   HENT:   Head: Normocephalic.   Cardiovascular: Normal rate, regular rhythm and normal heart sounds.   Pulmonary/Chest: Normal expansion and effort normal. No stridor. No respiratory distress. She has decreased breath sounds. She exhibits no tenderness.   Abdominal: She exhibits no distension.   Musculoskeletal:         General: No tenderness.      Cervical back: Neck supple.   Lymphadenopathy:     She has no cervical adenopathy.   Neurological: She is alert and oriented to person, place, and time. Gait normal.   Skin: Skin is warm. No cyanosis. Nails show no clubbing.   Psychiatric: She has a normal mood and affect. Her behavior is normal. Judgment and thought content normal.   Nursing note and vitals reviewed.    Laboratory  No results found for: WBC, RBC, HGB, HCT, MCV, MCH, MCHC, RDW, PLT, MPV, GRAN, LYMPH, MONO, EOS, BASO, EOSINOPHIL, BASOPHIL    BMP  Lab Results   Component Value Date    CREATININE 0.7 09/01/2022       No results found for: BNP    No results found for: TSH    No results found for: SEDRATE    No results found for: CRP    No results found for: IGE    No results found for: ASPERGILLUS  No results found for: AFUMIGATUSCL     No results found for: ACE    Diagnostic Results:  I have personally reviewed today the following studies :        ASSESSMENT/PLAN:     COPD suggested by initial evaluation  -     albuterol (PROVENTIL/VENTOLIN HFA) 90 mcg/actuation inhaler; Inhale 2 puffs into the lungs every 6 (six) hours as needed for Wheezing. Rescue  Dispense: 18 g; Refill: 5  -     Discontinue: umeclidinium-vilanteroL (ANORO ELLIPTA) 62.5-25 mcg/actuation DsDv; Inhale 1 puff into the lungs once daily. Controller  Dispense: 1 each; Refill: 5  -     Complete PFT with bronchodilator; Future; Expected date: 06/19/2023  -     X-Ray Chest PA And Lateral;  Future; Expected date: 06/05/2023  -     umeclidinium-vilanteroL (ANORO ELLIPTA) 62.5-25 mcg/actuation DsDv; Inhale 1 puff into the lungs once daily. Controller  Dispense: 1 each; Refill: 5    Pulmonary emphysema, unspecified emphysema type  -     albuterol (PROVENTIL/VENTOLIN HFA) 90 mcg/actuation inhaler; Inhale 2 puffs into the lungs every 6 (six) hours as needed for Wheezing. Rescue  Dispense: 18 g; Refill: 5  -     Complete PFT with bronchodilator; Future; Expected date: 06/19/2023  -     X-Ray Chest PA And Lateral; Future; Expected date: 06/05/2023  -     umeclidinium-vilanteroL (ANORO ELLIPTA) 62.5-25 mcg/actuation DsDv; Inhale 1 puff into the lungs once daily. Controller  Dispense: 1 each; Refill: 5    Tobacco dependence due to cigarettes    Smoking greater than 30 pack years        Albuterol p.r.n. start Breztri     Counseled about smoking cessation.  Willing to make effort to quit smoking.      Check chest x-ray PFT and 6 minute walking test.      Declined Prevnar 20.      June 2024 will qualify for low-dose screening CT chest based on her more than 30 pack year smoking history age 48.      Additional recommendation to follow above workup  Follow up in about 3 months (around 9/5/2023).    This note was prepared using voice recognition system and is likely to have sound alike errors that may have been overlooked even after proof reading.  Please call me with any questions    Discussed diagnosis, its evaluation, treatment and usual course. All questions answered.    Thank you for the courtesy of participating in the care of this patient    Chato Feliz MD

## 2023-07-05 ENCOUNTER — TELEPHONE (OUTPATIENT)
Dept: OBSTETRICS AND GYNECOLOGY | Facility: CLINIC | Age: 48
End: 2023-07-05
Payer: OTHER GOVERNMENT

## 2023-07-05 NOTE — TELEPHONE ENCOUNTER
----- Message from Ursula Talbot NP sent at 7/3/2023  1:47 PM CDT -----   Per pelvic ultrasound results, her uterus doesn't seem large enough to be affecting her bladder.

## 2023-07-05 NOTE — TELEPHONE ENCOUNTER
Called patient and after verifying with 2 identifiers the pelvic u/s results discussed.  Patient has appointment in September to see ED to go over results.

## 2023-08-07 ENCOUNTER — OFFICE VISIT (OUTPATIENT)
Dept: PULMONOLOGY | Facility: CLINIC | Age: 48
End: 2023-08-07
Payer: OTHER GOVERNMENT

## 2023-08-07 VITALS
SYSTOLIC BLOOD PRESSURE: 132 MMHG | HEART RATE: 92 BPM | WEIGHT: 201.63 LBS | DIASTOLIC BLOOD PRESSURE: 82 MMHG | OXYGEN SATURATION: 96 % | HEIGHT: 69 IN | BODY MASS INDEX: 29.86 KG/M2 | RESPIRATION RATE: 18 BRPM

## 2023-08-07 DIAGNOSIS — J41.8 MIXED SIMPLE AND MUCOPURULENT CHRONIC BRONCHITIS: ICD-10-CM

## 2023-08-07 DIAGNOSIS — F17.210 SMOKING GREATER THAN 30 PACK YEARS: Primary | ICD-10-CM

## 2023-08-07 DIAGNOSIS — F17.210 TOBACCO DEPENDENCE DUE TO CIGARETTES: ICD-10-CM

## 2023-08-07 PROCEDURE — 99213 OFFICE O/P EST LOW 20 MIN: CPT | Mod: S$PBB,,, | Performed by: INTERNAL MEDICINE

## 2023-08-07 PROCEDURE — 99999 PR PBB SHADOW E&M-EST. PATIENT-LVL V: CPT | Mod: PBBFAC,,, | Performed by: INTERNAL MEDICINE

## 2023-08-07 PROCEDURE — 99213 PR OFFICE/OUTPT VISIT, EST, LEVL III, 20-29 MIN: ICD-10-PCS | Mod: S$PBB,,, | Performed by: INTERNAL MEDICINE

## 2023-08-07 PROCEDURE — 99999 PR PBB SHADOW E&M-EST. PATIENT-LVL V: ICD-10-PCS | Mod: PBBFAC,,, | Performed by: INTERNAL MEDICINE

## 2023-08-07 PROCEDURE — 99215 OFFICE O/P EST HI 40 MIN: CPT | Mod: PBBFAC | Performed by: INTERNAL MEDICINE

## 2023-08-07 NOTE — PROGRESS NOTES
Pulmonary Outpatient Follow Up Visit     Subjective:       Patient ID: Diana Meneses is a 48 y.o. female.    Chief Complaint: COPD      HPI          Patient is a 48 y.o. female presenting for 2 months follow-up.      08/07/2023 started last visit on maintenance inhaleBREZTRI 2 puffs twice daily     Complain of chronic coughing yellowish sputum  wheezing SOB [facility] and was told she has mild asthma.      Not on inhalers.      1 PPD x 30 failed smoking cessation with multiple pharmacotherapy.      Disabled .    Review of Systems   HENT:  Negative for nosebleeds.    Eyes:  Negative for redness.   Respiratory:  Positive for shortness of breath. Negative for choking.    Genitourinary:  Negative for hematuria.   Endocrine:  Negative for cold intolerance.    Neurological:  Negative for syncope.   Hematological:  Negative for adenopathy.   Psychiatric/Behavioral:  Negative for confusion.        Outpatient Encounter Medications as of 8/7/2023   Medication Sig Dispense Refill    albuterol (PROVENTIL/VENTOLIN HFA) 90 mcg/actuation inhaler Inhale 2 puffs into the lungs every 6 (six) hours as needed for Wheezing. Rescue 18 g 5    budesonide-glycopyr-formoterol 160-9-4.8 mcg/actuation HFAA Inhale 2 puffs into the lungs 2 (two) times daily. Wash out mouth after use. 10.7 g 11    cyanocobalamin (VITAMIN B-12) 1000 MCG tablet 1,000 mcg.      ferrous sulfate (FEOSOL) 325 mg (65 mg iron) Tab tablet 325 mg.      glipiZIDE (GLUCOTROL) 10 MG tablet 10 mg.      ibuprofen (ADVIL,MOTRIN) 800 MG tablet Take 1 tablet (800 mg total) by mouth 3 (three) times daily. 60 tablet 0    metFORMIN (GLUCOPHAGE-XR) 750 MG ER 24hr tablet 750 mg.      mirabegron (MYRBETRIQ) 50 mg Tb24 Take 1 tablet (50 mg total) by mouth once daily. 30 tablet 11    nicotine, polacrilex, (NICORETTE) 2 mg Gum Take 1 each (2 mg total) by mouth as needed. 100 each 0    rosuvastatin (CRESTOR) 5 MG  "tablet Take 1 tablet by mouth once daily.      solifenacin (VESICARE) 10 MG tablet Take 1 tablet (10 mg total) by mouth once daily. 30 tablet 11    vitamin D (VITAMIN D3) 1000 units Tab 25 mcg.       No facility-administered encounter medications on file as of 8/7/2023.       Objective:     Vital Signs (Most Recent)  Vital Signs  Pulse: 92  Resp: 18  SpO2: 96 %  BP: 132/82  BP Location: Left arm  Patient Position: Sitting  Pain Score: 0-No pain  Height and Weight  Height: 5' 9" (175.3 cm)  Weight: 91.4 kg (201 lb 9.8 oz)  BSA (Calculated - sq m): 2.11 sq meters  BMI (Calculated): 29.8  Weight in (lb) to have BMI = 25: 168.9]  Wt Readings from Last 2 Encounters:   08/07/23 91.4 kg (201 lb 9.8 oz)   06/22/23 94.5 kg (208 lb 5.4 oz)       Physical Exam   Constitutional: She is oriented to person, place, and time. She appears well-developed and well-nourished. No distress.   HENT:   Head: Normocephalic.   Cardiovascular: Normal rate, regular rhythm and normal heart sounds.   Pulmonary/Chest: Normal expansion and effort normal. No stridor. No respiratory distress. She has decreased breath sounds. She exhibits no tenderness.   Abdominal: She exhibits no distension.   Musculoskeletal:         General: No tenderness.      Cervical back: Neck supple.   Lymphadenopathy:     She has no cervical adenopathy.   Neurological: She is alert and oriented to person, place, and time. Gait normal.   Skin: Skin is warm. No cyanosis. Nails show no clubbing.   Psychiatric: She has a normal mood and affect. Her behavior is normal. Judgment and thought content normal.   Nursing note and vitals reviewed.      Laboratory  No results found for: "WBC", "RBC", "HGB", "HCT", "MCV", "MCH", "MCHC", "RDW", "PLT", "MPV", "GRAN", "LYMPH", "MONO", "EOS", "BASO", "EOSINOPHIL", "BASOPHIL"    BMP  Lab Results   Component Value Date    CREATININE 0.7 09/01/2022       No results found for: "BNP"    No results found for: "TSH"    No results found for: " ""SEDRATE"    No results found for: "CRP"  No results found for: "IGE"     No results found for: "ASPERGILLUS"  No results found for: "AFUMIGATUSCL"     No results found for: "ACE"     Diagnostic Results:  I have personally reviewed today the following studies:          CXR 6/2023          Assessment/Plan:   Smoking greater than 30 pack years  -     CT Chest Lung Screening Low Dose; Future; Expected date: 08/07/2023  -     Ambulatory referral/consult to Smoking Cessation Program; Future; Expected date: 08/14/2023    Mixed simple and mucopurulent chronic bronchitis  -     Stress test, pulmonary; Future; Expected date: 02/07/2024    Tobacco dependence due to cigarettes  -     Ambulatory referral/consult to Smoking Cessation Program; Future; Expected date: 08/14/2023        Albuterol     BREZTRI 2 puffs twice daily    1 PPD x > 30 years agreeable for referral to smoking cessation Clinic    Agreeable for low-dose screening CT chest    Follow up in about 6 months (around 2/7/2024).    This note was prepared using voice recognition system and is likely to have sound alike errors that may have been overlooked even after proof reading.  Please call me with any questions    Discussed diagnosis, its evaluation, treatment and usual course. All questions answered.      Chato Feliz MD    "

## 2023-08-15 ENCOUNTER — HOSPITAL ENCOUNTER (OUTPATIENT)
Dept: RADIOLOGY | Facility: HOSPITAL | Age: 48
Discharge: HOME | End: 2023-08-15
Attending: INTERNAL MEDICINE
Payer: OTHER GOVERNMENT

## 2023-08-15 DIAGNOSIS — F17.210 SMOKING GREATER THAN 30 PACK YEARS: ICD-10-CM

## 2023-08-15 PROCEDURE — 71271 CT THORAX LUNG CANCER SCR C-: CPT | Mod: TC

## 2023-08-15 PROCEDURE — 71271 CT THORAX LUNG CANCER SCR C-: CPT | Mod: 26,,, | Performed by: RADIOLOGY

## 2023-08-15 PROCEDURE — 71271 CT CHEST LUNG SCREENING LOW DOSE: ICD-10-PCS | Mod: 26,,, | Performed by: RADIOLOGY

## 2023-08-17 DIAGNOSIS — F17.210 TOBACCO DEPENDENCE DUE TO CIGARETTES: Primary | ICD-10-CM

## 2023-08-17 DIAGNOSIS — F17.210 SMOKING GREATER THAN 30 PACK YEARS: ICD-10-CM

## 2023-08-28 ENCOUNTER — TELEPHONE (OUTPATIENT)
Dept: SMOKING CESSATION | Facility: CLINIC | Age: 48
End: 2023-08-28
Payer: OTHER GOVERNMENT

## 2023-08-28 ENCOUNTER — CLINICAL SUPPORT (OUTPATIENT)
Dept: SMOKING CESSATION | Facility: CLINIC | Age: 48
End: 2023-08-28
Payer: COMMERCIAL

## 2023-08-28 DIAGNOSIS — F17.200 NICOTINE DEPENDENCE: Primary | ICD-10-CM

## 2023-08-28 PROCEDURE — 99404 PREV MED CNSL INDIV APPRX 60: CPT | Mod: S$GLB,,, | Performed by: SPEECH-LANGUAGE PATHOLOGIST

## 2023-08-28 PROCEDURE — 99999 PR PBB SHADOW E&M-EST. PATIENT-LVL I: ICD-10-PCS | Mod: PBBFAC,,, | Performed by: SPEECH-LANGUAGE PATHOLOGIST

## 2023-08-28 PROCEDURE — 99404 PR PREVENT COUNSEL,INDIV,60 MIN: ICD-10-PCS | Mod: S$GLB,,, | Performed by: SPEECH-LANGUAGE PATHOLOGIST

## 2023-08-28 PROCEDURE — 99999 PR PBB SHADOW E&M-EST. PATIENT-LVL I: CPT | Mod: PBBFAC,,, | Performed by: SPEECH-LANGUAGE PATHOLOGIST

## 2023-08-28 NOTE — PROGRESS NOTES
"At [facility], patient reports smoking 20 cpd. She reports the past 14 days she switched Lisa little cigars & states that she "isn't getting as much nicotine from it".  Assessed CO. CO 8. Patient would state "put whatever" when given choices to questions on smoking assessment. Discussed the role of tobacco cessation program, role of NRT & behavioral changes to assist the patient to reach her goal of being tobacco free. The patient reports she is willing to utilize inhaler & [facility] to [facility] & will return for a follow up visit.  Education & instruction on the role of the NRT & usage. Patient provided a written handout on usage of the NRT. The patient verbalized understanding & willingness to apply. Patient instructed to call CTTS anytime. Follow up visit set with the patient for 9/6/2023 at 3:00 pm.     "

## 2023-08-28 NOTE — LETTER
[facility]  [address] [facility] [facility]  [place] [address]  Phone: [phone]  Fax: [phone]  FAX: [phone] August 28, 2023     Patient: Diana Meneses   YOB: 1975   Date of Visit: 8/28/2023       Dear Dr. Mon:    Ms Meneses came to an appointment today for Smoking Cessation & would like to use the nicotrol inhaler as her prescribed medication to assist with her quit.  While she is part of the [facility], the trust does not cover the cost of the inhaler. Would you mind prescribing Ms Meneses the nicotrol inhaler in effort to assist with her quit? She will continue to attend our therapy sessions in addition to utilizing the inhaler.     If you have any questions or concerns, please don't hesitate to contact my office.    Sincerely,        Chantelle Haas MS CCC-SLP, CTTS  Certified Tobacco Treatment Specialist  Office: [phone]  Work cell: [phone]  Fax: [phone]

## 2023-08-28 NOTE — TELEPHONE ENCOUNTER
Call to patient to remind of scheduled 2 pm appointment today. Patient requests that she doesn't receive any phone calls nor text reminders from [facility] that she feels they are harassing due to she gets them all the time. She is open to emails & CTTS noted in her communication preference. She also doesn't want any calls before 8 am .

## 2023-09-06 ENCOUNTER — CLINICAL SUPPORT (OUTPATIENT)
Dept: SMOKING CESSATION | Facility: CLINIC | Age: 48
End: 2023-09-06
Payer: COMMERCIAL

## 2023-09-06 ENCOUNTER — TELEPHONE (OUTPATIENT)
Dept: SMOKING CESSATION | Facility: CLINIC | Age: 48
End: 2023-09-06
Payer: OTHER GOVERNMENT

## 2023-09-06 DIAGNOSIS — F17.200 NICOTINE DEPENDENCE: Primary | ICD-10-CM

## 2023-09-06 PROCEDURE — 99999 PR PBB SHADOW E&M-EST. PATIENT-LVL II: ICD-10-PCS | Mod: PBBFAC,,, | Performed by: SPEECH-LANGUAGE PATHOLOGIST

## 2023-09-06 PROCEDURE — 99999 PR PBB SHADOW E&M-EST. PATIENT-LVL II: CPT | Mod: PBBFAC,,, | Performed by: SPEECH-LANGUAGE PATHOLOGIST

## 2023-09-06 PROCEDURE — 99404 PR PREVENT COUNSEL,INDIV,60 MIN: ICD-10-PCS | Mod: S$GLB,,, | Performed by: SPEECH-LANGUAGE PATHOLOGIST

## 2023-09-06 PROCEDURE — 99404 PREV MED CNSL INDIV APPRX 60: CPT | Mod: S$GLB,,, | Performed by: SPEECH-LANGUAGE PATHOLOGIST

## 2023-09-06 RX ORDER — ASPIRIN/CALCIUM CARB/MAGNESIUM 325 MG
4 TABLET ORAL
Qty: 300 LOZENGE | Refills: 0 | Status: SHIPPED | OUTPATIENT
Start: 2023-09-06 | End: 2023-10-24

## 2023-09-06 RX ORDER — IBUPROFEN 200 MG
1 TABLET ORAL DAILY
Qty: 28 PATCH | Refills: 0 | Status: SHIPPED | OUTPATIENT
Start: 2023-09-06 | End: 2023-10-24 | Stop reason: SDUPTHER

## 2023-09-06 RX ORDER — DIPHENHYDRAMINE HCL 25 MG
4 CAPSULE ORAL
Qty: 300 EACH | Refills: 0 | Status: SHIPPED | OUTPATIENT
Start: 2023-09-06 | End: 2023-10-24

## 2023-09-06 NOTE — PROGRESS NOTES
Individual Follow-Up Form    9/6/2023    Quit Date: TBD    Clinical Status of Patient: Outpatient    Continuing Medication: yes  Inhal[facility])    Other Medications: ordered 21 mg patches, 4 mg gum & 4 mg lozenges this date      Target Symptoms: Withdrawal and medication side effects. The following were  rated moderate (3) to severe (4) on TCRS:  Moderate (3):   Severe (4):     Comments: Patient seen in clinic. Patient has not received the [facility] the [facility] yet. CTTS provided patient with a copy of the letter that was faxed to her PCP & confirmation of the fax that was received on 8/29/2023 regarding her request for the inhaler. Assessed CO with CO 27. Patient states she would like to receive some tools to assist her today due to she doesn't want to waste any more time waiting for products from the [facility] & she will go to the [facility] to discuss getting her inhaler. CTTS ordered 21 mg patches, 4 mg gum & 4 mg lozenges this date for the patient to utilize. Discussed s/s of nicotine withdrawal & provided patient with a written copy to reference. Patient reports she has placed the fidget & stressed items strategically in her home & car to assist her with cuing with making changes to her smoking pattern & habits. Goal is utilize the NRT products & work toward reduction. Follow up set up 9/27/2023 at 3:00 pm    Diagnosis: F17.200    Next Visit: 3 weeks

## 2023-09-07 ENCOUNTER — TELEPHONE (OUTPATIENT)
Dept: SMOKING CESSATION | Facility: CLINIC | Age: 48
End: 2023-09-07
Payer: OTHER GOVERNMENT

## 2023-09-07 NOTE — TELEPHONE ENCOUNTER
Patient called to report the [facility] states they did not receive my fax on 8/29/2023 to the PCP for the patient's request of the nicotrol inhaler. She reports she will [facility] to the [facility] today to discuss & that the dr may send over information for CTTS to fill out regarding the request for the inhaler. CTTS provided fax numbers for both clinics, provided CTTS email & notified patient the days that CTTS is at each clinic. CTTS also provided desk & cell numbers that the PCP can reach CTTS at if necessary.

## 2023-09-11 ENCOUNTER — OFFICE VISIT (OUTPATIENT)
Dept: OBSTETRICS AND GYNECOLOGY | Facility: CLINIC | Age: 48
End: 2023-09-11
Payer: OTHER GOVERNMENT

## 2023-09-11 VITALS — HEIGHT: 69 IN | BODY MASS INDEX: 29.78 KG/M2 | WEIGHT: 201.06 LBS

## 2023-09-11 DIAGNOSIS — B37.31 VULVOVAGINAL CANDIDIASIS: ICD-10-CM

## 2023-09-11 DIAGNOSIS — D21.9 FIBROIDS: Primary | ICD-10-CM

## 2023-09-11 PROCEDURE — 99213 OFFICE O/P EST LOW 20 MIN: CPT | Mod: S$PBB,,, | Performed by: OBSTETRICS & GYNECOLOGY

## 2023-09-11 PROCEDURE — 99213 PR OFFICE/OUTPT VISIT, EST, LEVL III, 20-29 MIN: ICD-10-PCS | Mod: S$PBB,,, | Performed by: OBSTETRICS & GYNECOLOGY

## 2023-09-11 PROCEDURE — 99215 OFFICE O/P EST HI 40 MIN: CPT | Mod: PBBFAC | Performed by: OBSTETRICS & GYNECOLOGY

## 2023-09-11 PROCEDURE — 99999 PR PBB SHADOW E&M-EST. PATIENT-LVL V: ICD-10-PCS | Mod: PBBFAC,,, | Performed by: OBSTETRICS & GYNECOLOGY

## 2023-09-11 PROCEDURE — 99999 PR PBB SHADOW E&M-EST. PATIENT-LVL V: CPT | Mod: PBBFAC,,, | Performed by: OBSTETRICS & GYNECOLOGY

## 2023-09-11 RX ORDER — CLOTRIMAZOLE AND BETAMETHASONE DIPROPIONATE 10; .64 MG/G; MG/G
CREAM TOPICAL 2 TIMES DAILY
Qty: 45 G | Refills: 0 | Status: SHIPPED | OUTPATIENT
Start: 2023-09-11 | End: 2023-09-16

## 2023-09-11 RX ORDER — METFORMIN HYDROCHLORIDE 750 MG/1
750 TABLET, EXTENDED RELEASE ORAL
COMMUNITY
Start: 2023-07-03

## 2023-09-11 RX ORDER — GLIMEPIRIDE 4 MG/1
8 TABLET ORAL
COMMUNITY
Start: 2023-07-26

## 2023-09-11 RX ORDER — FLUCONAZOLE 150 MG/1
150 TABLET ORAL DAILY
Qty: 1 TABLET | Refills: 0 | Status: SHIPPED | OUTPATIENT
Start: 2023-09-11 | End: 2023-09-12

## 2023-09-11 RX ORDER — CHOLECALCIFEROL (VITAMIN D3) 25 MCG
25 TABLET ORAL
COMMUNITY
Start: 2022-10-17

## 2023-09-11 RX ORDER — ALBUTEROL SULFATE 90 UG/1
AEROSOL, METERED RESPIRATORY (INHALATION)
COMMUNITY
Start: 2023-06-30

## 2023-09-11 RX ORDER — ALOGLIPTIN 25 MG/1
1 TABLET, FILM COATED ORAL DAILY
COMMUNITY
Start: 2023-07-26

## 2023-09-11 NOTE — LETTER
September 11, 2023      [facility]  [address] [facility] [address]  Phone: [phone]  Fax: [phone]       Patient: Diana Meneses   YOB: 1975  Date of Visit: 09/11/2023    To Whom It May Concern:    Jacky Meneses  was at [facility] on 09/11/2023.If you have any questions or concerns, or if I can be of further assistance, please do not hesitate to contact me.    Sincerely,  Dr Kita Castillo/alex

## 2023-09-11 NOTE — PROGRESS NOTES
Subjective:      Patient ID: Diana Meneses is a 48 y.o. female.    Chief Complaint:  Fibroids      History of Present Illness  HPI  Presents for consultation for fibroids.  Pt has been seeing Leonila Schwartz NP, for urinary incontinence.  Symptoms are now well-controlled on Myrbetriq.  Was told her uterus may be putting pressure on her bladder, casuing incontinence, but finds that incontinence is improved on meds.  Pelvic ultrasound: uterus 10.7cm with 2 fibroids (4.2cm and 1.8cm).  Pt has no vaginal bleeding, no pelvic pain, and no pressure.  Is confused about her findings because she was told she has a large fibroid.  Last pap: 23: normal, HPV neg    GYN & OB History  No LMP recorded. (Menstrual status: Other).   Date of Last Pap: 2023    OB History    Para Term  AB Living   2 2       2   SAB IAB Ectopic Multiple Live Births           2      # Outcome Date GA Lbr [place]/2nd Weight Sex Delivery Anes PTL Lv   2 Para      Vag-Spont      1 Para      Vag-Spont          Review of Systems  Review of Systems       Objective:     Physical Exam:   Constitutional: She is oriented to person, place, and time. She appears well-developed and well-nourished. No distress.             Abdominal: Soft. She exhibits no distension and no mass. There is no abdominal tenderness. There is no rebound and no guarding. Hernia confirmed negative in the right inguinal area and confirmed negative in the left inguinal area.     Genitourinary:    Vagina normal.      Pelvic exam was performed with patient supine.   There is rash (erythema of BL labia majora with satellite lesions c/w yeast) on the right labia. There is no tenderness, lesion or injury on the right labia. There is rash on the left labia. There is no tenderness, lesion or injury on the left labia. Right adnexum displays no mass, no tenderness and no fullness. Left adnexum displays no mass, no tenderness and no fullness. No erythema,  no vaginal discharge,  tenderness, bleeding, rectocele, cystocele or unspecified prolapse of vaginal walls in the vagina.    No foreign body in the vagina.      No signs of injury in the vagina.   Cervix exhibits no motion tenderness, no discharge and no friability. Uterus is not deviated, not enlarged, not fixed and not tender.               Neurological: She is alert and oriented to person, place, and time.     Psychiatric:   Distrustful of medical team as pt has had several explanations of her fibroid sizes         Assessment:     1. Fibroids               Plan:     Diana was seen today for fibroids.    Diagnoses and all orders for this visit:    Fibroids    Vulvovaginal candidiasis  -     fluconazole (DIFLUCAN) 150 MG Tab; Take 1 tablet (150 mg total) by mouth once daily. for 1 day  -     clotrimazole-betamethasone 1-0.05% (LOTRISONE) cream; Apply topically 2 (two) times daily. for 5 days     Uterus feels normal size and small on exam.  Fibroids are small and not likely causing urinary problems.  She is otherwise completely asymptomatic from her fibroids.  Continue follow-up with urology.  Incontinence improved on myrbetriq.  RTC prn.

## 2023-09-11 NOTE — LETTER
September 11, 2023      [facility]  [address] [address]  Phone: [phone]  Fax: [phone]       Patient: Diana Meneses   YOB: 1975  Date of Visit: 09/11/2023    To Whom It May Concern:    Jacky Meneses  was at [facility] on 09/11/2023. The patient may return to work/school on 09/11/2022 with no restrictions. If you have any questions or concerns, or if I can be of further assistance, please do not hesitate to contact me.    Sincerely,    Dr Castillo/alex

## 2023-09-27 ENCOUNTER — TELEPHONE (OUTPATIENT)
Dept: SMOKING CESSATION | Facility: CLINIC | Age: 48
End: 2023-09-27
Payer: OTHER GOVERNMENT

## 2023-09-28 ENCOUNTER — CLINICAL SUPPORT (OUTPATIENT)
Dept: SMOKING CESSATION | Facility: CLINIC | Age: 48
End: 2023-09-28
Payer: COMMERCIAL

## 2023-09-28 DIAGNOSIS — F17.200 NICOTINE DEPENDENCE: Primary | ICD-10-CM

## 2023-09-28 PROCEDURE — 99404 PR PREVENT COUNSEL,INDIV,60 MIN: ICD-10-PCS | Mod: S$GLB,,, | Performed by: SPEECH-LANGUAGE PATHOLOGIST

## 2023-09-28 PROCEDURE — 99404 PREV MED CNSL INDIV APPRX 60: CPT | Mod: S$GLB,,, | Performed by: SPEECH-LANGUAGE PATHOLOGIST

## 2023-09-28 NOTE — PROGRESS NOTES
Individual Follow-Up Form    9/28/2023    Quit Date: TBD    Clinical Status of Patient: Outpatient    Continuing Medication: yes  Patches 21 mg,  4 mg lozenges & inhaler; has 4 mg gum (not using due to states that aren't help)     Other Medications:      Target Symptoms: Withdrawal and medication side effects. The following were  rated moderate (3) to severe (4) on TCRS:  Moderate (3):   Severe (4):     Comments: Telephone visit due to the patient mixed up her appointment that was originally scheduled 9/27/2023. Patient reports [facility]; but hasn't used it yet. She reports she did utilize the 21 mg patches & 4 mg lozenges & found them helpful when she did use them. She reports she is smoking 1/2 pack little cigars reporting 10 per day. She reports she is working on getting back on the [employer] to work toward her quit. Discussed with patient the CO with cigars vs cigarettes. Commended patient on her efforts with trying her gum, lozenges & patches & realizing the gum is not a fit for her. She reports she was able to go for longer periods of time without smoking when she utilized her patches & lozenges. Discussed with patient the s/s of nicotine withdrawal & stress on the body & how nicotine gives the illusion of relieving stress when it is just the withdrawal process & how her NRT can assist her with managing the withdrawal experience. Patient states her goal is to use her inhaler. Follow up set for 10/24/2023 at 3:00 pm.     Diagnosis: F17.200    Next Visit: 4 weeks

## 2023-10-24 ENCOUNTER — CLINICAL SUPPORT (OUTPATIENT)
Dept: SMOKING CESSATION | Facility: CLINIC | Age: 48
End: 2023-10-24
Payer: COMMERCIAL

## 2023-10-24 DIAGNOSIS — F17.200 NICOTINE DEPENDENCE: Primary | ICD-10-CM

## 2023-10-24 PROCEDURE — 99404 PREV MED CNSL INDIV APPRX 60: CPT | Mod: S$GLB,,, | Performed by: SPEECH-LANGUAGE PATHOLOGIST

## 2023-10-24 PROCEDURE — 99999 PR PBB SHADOW E&M-EST. PATIENT-LVL II: ICD-10-PCS | Mod: PBBFAC,,, | Performed by: SPEECH-LANGUAGE PATHOLOGIST

## 2023-10-24 PROCEDURE — 99404 PR PREVENT COUNSEL,INDIV,60 MIN: ICD-10-PCS | Mod: S$GLB,,, | Performed by: SPEECH-LANGUAGE PATHOLOGIST

## 2023-10-24 PROCEDURE — 99999 PR PBB SHADOW E&M-EST. PATIENT-LVL II: CPT | Mod: PBBFAC,,, | Performed by: SPEECH-LANGUAGE PATHOLOGIST

## 2023-10-24 RX ORDER — IBUPROFEN 200 MG
1 TABLET ORAL DAILY
Qty: 28 PATCH | Refills: 0 | Status: SHIPPED | OUTPATIENT
Start: 2023-10-24

## 2023-10-24 RX ORDER — ASPIRIN/CALCIUM CARB/MAGNESIUM 325 MG
4 TABLET ORAL
Qty: 432 LOZENGE | Refills: 0 | Status: SHIPPED | OUTPATIENT
Start: 2023-10-24

## 2023-10-24 NOTE — PROGRESS NOTES
"Individual Follow-Up Form    10/24/2023    Quit Date: TBD     Clinical Status of Patient: Outpatient     Continuing Medication: yes  Patches 21 mg,  4 mg lozenges & inhaler;       Other Medications:    Target Symptoms: Withdrawal and medication side effects. The following were   rated moderate (3) to severe (4) on TCRS:  Moderate (3):   Severe (4):     Comments: Patient seen in clinic. Patient reports she has continued to smoke & "not gotten back on the wagon". Assessed CO with patient reporting smoking 6 little cigars prior. CO 42. She reports she hasn't used her inhaler yet. She reports she finds patches & lozenges were helpful when she used them. She reports she is currently smoking a little less than a pack.  She reports she hasn't used the patches or lozenges.  Discussed nicotine withdrawal symptoms, psychological dependence & sensory cues. Administered AUTOS scale & provided patient with written literature to reference. Patient states her goal is to tackle her home task with the silver fish. She has a plan to use her inhaler when she is outside on her patio. Follow up set for 11/28/2023 at 3:00 pm.     Diagnosis: F17.200    Next Visit: 5 weeks    "

## 2023-11-16 ENCOUNTER — TELEPHONE (OUTPATIENT)
Dept: SMOKING CESSATION | Facility: CLINIC | Age: 48
End: 2023-11-16
Payer: OTHER GOVERNMENT

## 2023-11-28 ENCOUNTER — CLINICAL SUPPORT (OUTPATIENT)
Dept: SMOKING CESSATION | Facility: CLINIC | Age: 48
End: 2023-11-28
Payer: COMMERCIAL

## 2023-11-28 DIAGNOSIS — F17.200 NICOTINE DEPENDENCE: Primary | ICD-10-CM

## 2023-11-28 PROCEDURE — 99404 PR PREVENT COUNSEL,INDIV,60 MIN: ICD-10-PCS | Mod: S$GLB,,, | Performed by: SPEECH-LANGUAGE PATHOLOGIST

## 2023-11-28 PROCEDURE — 99999 PR PBB SHADOW E&M-EST. PATIENT-LVL II: ICD-10-PCS | Mod: PBBFAC,,, | Performed by: SPEECH-LANGUAGE PATHOLOGIST

## 2023-11-28 PROCEDURE — 99999 PR PBB SHADOW E&M-EST. PATIENT-LVL II: CPT | Mod: PBBFAC,,, | Performed by: SPEECH-LANGUAGE PATHOLOGIST

## 2023-11-28 PROCEDURE — 99404 PREV MED CNSL INDIV APPRX 60: CPT | Mod: S$GLB,,, | Performed by: SPEECH-LANGUAGE PATHOLOGIST

## 2023-11-28 NOTE — PROGRESS NOTES
Individual Follow-Up Form    11/28/2023    Quit Date: TBD    Clinical Status of Patient: Outpatient    Continuing Medication: yes  Patches 21 mg,  4 mg lozenges & inhaler     Other Medications:      Target Symptoms: Withdrawal and medication side effects. The following were  rated moderate (3) to severe (4) on TCRS:  Moderate (3):   Severe (4):     Comments: Patient seen in clinic. She reports she hasn't been utilizing her NRT yet; but plans on using them now that she is done with her project. Assessed CO with patient reporting smoking 2 prior. CO 21.  Discussed with patient how to utilize her inhaler in addition to utilizing her lozenges every hour as well as wearing her patches to assist her with achieving her quit & navigating her withdrawal experience. Patient reports she carries her lozenges with her. Discussed cartridge usage, how long they list, symptoms for the patient to look for with active cartridges in addition to the 24 hour period. Instructed patient on puffing on the inhaler & not inhaling. Patient instructed to use as much as necessary to manage her cravings not to exceed 16 cartridges in a day. Patient expressed how wonderful it would be to enter the New Year as a non smoker. Goal is to use her tools to work toward her quit. Follow up set for 1/8/2023 at 3:00 pm      Diagnosis: F17.200    Next Visit: 6 weeks    
coffee/pop/soda

## 2024-01-08 ENCOUNTER — CLINICAL SUPPORT (OUTPATIENT)
Dept: SMOKING CESSATION | Facility: CLINIC | Age: 49
End: 2024-01-08
Payer: COMMERCIAL

## 2024-01-08 DIAGNOSIS — F17.200 NICOTINE DEPENDENCE: Primary | ICD-10-CM

## 2024-01-08 PROCEDURE — 99404 PREV MED CNSL INDIV APPRX 60: CPT | Mod: S$GLB,,, | Performed by: SPEECH-LANGUAGE PATHOLOGIST

## 2024-01-08 PROCEDURE — 99999 PR PBB SHADOW E&M-EST. PATIENT-LVL II: CPT | Mod: PBBFAC,,, | Performed by: SPEECH-LANGUAGE PATHOLOGIST

## 2024-01-08 NOTE — PROGRESS NOTES
Individual Follow-Up Form    1/8/2024    Quit Date: TBD    Clinical Status of Patient: Outpatient    Continuing Medication: yes  Patches 21 mg, 4 mg lozenges, inhaler    Other Medications:      Target Symptoms: Withdrawal and medication side effects. The following were  rated moderate (3) to severe (4) on TCRS:  Moderate (3):   Severe (4):     Comments: Telephone visit due to having to work from home due to inclement weather. Patient reports she did try the inhaler & that she put her patch on; but that she ended up smoking after. She reports the inhaler wasn't as strong as she expected which was disappointing. Discussed strategies with patient to include strategies of double patches, using the lozenge first in the day & then when it tapers off use the inhaler. She reports having 30 little cigars left & doesn't want to go buy another pack & states her plan is to use her tools to get her further along. Follow up set for 1/31/2024 at 4:00 pm.     Diagnosis: F17.200    Next Visit: 3 weeks

## 2024-01-31 ENCOUNTER — TELEPHONE (OUTPATIENT)
Dept: SMOKING CESSATION | Facility: CLINIC | Age: 49
End: 2024-01-31
Payer: OTHER GOVERNMENT

## 2024-01-31 NOTE — TELEPHONE ENCOUNTER
Patient text to say she is getting lab work, xrays on her GI track & flagyl prescription so she's not sure if she will be able to answer the phone at 4 pm,

## 2024-01-31 NOTE — TELEPHONE ENCOUNTER
Text sent to patient to see if she is available to talk due to she was at the dr getting labs, xrays  and a prescription. She states she will be home in 15 min & will call CTTS.

## 2024-02-08 ENCOUNTER — TELEPHONE (OUTPATIENT)
Dept: PULMONOLOGY | Facility: CLINIC | Age: 49
End: 2024-02-08
Payer: OTHER GOVERNMENT

## 2024-02-12 ENCOUNTER — OFFICE VISIT (OUTPATIENT)
Dept: PULMONOLOGY | Facility: CLINIC | Age: 49
End: 2024-02-12
Payer: OTHER GOVERNMENT

## 2024-02-12 ENCOUNTER — CLINICAL SUPPORT (OUTPATIENT)
Dept: PULMONOLOGY | Facility: CLINIC | Age: 49
End: 2024-02-12
Payer: OTHER GOVERNMENT

## 2024-02-12 VITALS
RESPIRATION RATE: 16 BRPM | HEIGHT: 69 IN | OXYGEN SATURATION: 99 % | HEIGHT: 69 IN | WEIGHT: 199 LBS | SYSTOLIC BLOOD PRESSURE: 140 MMHG | WEIGHT: 199.06 LBS | HEART RATE: 60 BPM | BODY MASS INDEX: 29.47 KG/M2 | DIASTOLIC BLOOD PRESSURE: 70 MMHG | BODY MASS INDEX: 29.48 KG/M2

## 2024-02-12 DIAGNOSIS — J41.8 MIXED SIMPLE AND MUCOPURULENT CHRONIC BRONCHITIS: ICD-10-CM

## 2024-02-12 DIAGNOSIS — J43.9 PULMONARY EMPHYSEMA, UNSPECIFIED EMPHYSEMA TYPE: ICD-10-CM

## 2024-02-12 DIAGNOSIS — F17.210 CIGARETTE NICOTINE DEPENDENCE WITHOUT COMPLICATION: Primary | ICD-10-CM

## 2024-02-12 DIAGNOSIS — J44.9 COPD SUGGESTED BY INITIAL EVALUATION: ICD-10-CM

## 2024-02-12 PROCEDURE — 99211 OFF/OP EST MAY X REQ PHY/QHP: CPT | Mod: PBBFAC

## 2024-02-12 PROCEDURE — 94618 PULMONARY STRESS TESTING: CPT | Mod: PBBFAC

## 2024-02-12 PROCEDURE — 94618 PULMONARY STRESS TESTING: CPT | Mod: 26,S$PBB,, | Performed by: INTERNAL MEDICINE

## 2024-02-12 PROCEDURE — 99999 PR PBB SHADOW E&M-EST. PATIENT-LVL I: CPT | Mod: PBBFAC,,,

## 2024-02-12 PROCEDURE — 99215 OFFICE O/P EST HI 40 MIN: CPT | Mod: PBBFAC,27,25 | Performed by: HOSPITALIST

## 2024-02-12 PROCEDURE — 99214 OFFICE O/P EST MOD 30 MIN: CPT | Mod: 25,S$PBB,, | Performed by: HOSPITALIST

## 2024-02-12 PROCEDURE — 99999 PR PBB SHADOW E&M-EST. PATIENT-LVL V: CPT | Mod: PBBFAC,,, | Performed by: HOSPITALIST

## 2024-02-12 RX ORDER — ALBUTEROL SULFATE 90 UG/1
2 AEROSOL, METERED RESPIRATORY (INHALATION) EVERY 6 HOURS PRN
Qty: 18 G | Refills: 5 | Status: SHIPPED | OUTPATIENT
Start: 2024-02-12 | End: 2025-02-11

## 2024-02-12 RX ORDER — ONDANSETRON 4 MG/1
4 TABLET, ORALLY DISINTEGRATING ORAL
COMMUNITY
Start: 2024-01-31

## 2024-02-12 NOTE — PROGRESS NOTES
"Subjective:      Patient ID: Diana Meneses is a 48 y.o. female.    Chief Complaint: Tobacco use    HPI 2/12/24:    48 year old female with history of HLD, tobacco use who presents to Pulmonary clinic for routine follow up. She was last seen in clinic 8/2023 by Dr. CARROL Feliz- at that visit she was continued on Breztri, was referred to smoking cessation.     She has been staying with smoking cessation and thinks that April has given her a lot of good information, but has only been able to cut down from a pack a day to 18-20 cigarettes per day. Had a sinus infection in December and is now being treated for C. Diff. With a change in her brand and nicotine concentration of cigarettes she has noticed a decrease in sputum production.     Pertinent Work Up:  6MW 2/12/24- Resting sat 95%, SpO2 range 92-97%, no pauses, 426m (78% predicted)  LDCT 8/2023- Lung rads 2, no nodules, minimal emphysema    Pulmonary Interventions:   Albuterol HFA  Breztri- using as needed    Smoking hx: Switched to lower nicotine cigarettes, still smoking around 18 cigarettes per day    Review of Systems   Respiratory:  Positive for cough and sputum production. Negative for shortness of breath, wheezing and dyspnea on extertion.      Objective:     Physical Exam   Constitutional: She is oriented to person, place, and time. She appears well-developed and well-nourished. She is obese.   Cardiovascular: Normal rate and regular rhythm.   Pulmonary/Chest: Normal expansion, effort normal and breath sounds normal.   Musculoskeletal:         General: No edema.   Neurological: She is alert and oriented to person, place, and time.     Personal Diagnostic Review  As Above      9/11/2023     2:00 PM 8/7/2023     3:21 PM 6/22/2023    10:40 AM 6/5/2023     1:53 PM 11/1/2022     2:19 PM 9/1/2022     3:09 PM 9/10/2015    12:35 PM   Pulmonary Function Tests   SpO2  96 %  97 %      Height 5' 9" (1.753 m) 5' 9" (1.753 m)  5' 9" (1.753 m) 5' 9" (1.753 m) 5' 9" " "(1.753 m) 5' 9" (1.753 m)   Weight 91.2 kg (201 lb 1 oz) 91.4 kg (201 lb 9.8 oz) 94.5 kg (208 lb 5.4 oz) 94 kg (207 lb 5.5 oz) 86.2 kg (190 lb) 86.6 kg (190 lb 14.7 oz) 86.2 kg (190 lb)   BMI (Calculated) 29.7 29.8  30.6 28 28.2 28.1        Assessment:     No diagnosis found.     Outpatient Encounter Medications as of 2/12/2024   Medication Sig Dispense Refill    albuterol (PROVENTIL/VENTOLIN HFA) 90 mcg/actuation inhaler Inhale 2 puffs into the lungs every 6 (six) hours as needed for Wheezing. Rescue 18 g 5    albuterol (PROVENTIL/VENTOLIN HFA) 90 mcg/actuation inhaler INHALE 2 PUFFS BY MOUTH EVERY SIX HOURS AS NEEDED FOR WHEEZING. RESCUE      alogliptin (NESINA) 25 mg Tab Take 1 tablet by mouth once daily.      budesonide-glycopyr-formoterol 160-9-4.8 mcg/actuation HFAA Inhale 2 puffs into the lungs 2 (two) times daily. Wash out mouth after use. 10.7 g 11    budesonide-glycopyr-formoterol 160-9-4.8 mcg/actuation HFAA INHALE TWO INHALATIONS BY MOUTH TWICE A DAY INTO THE LUNGS;WASH OUT MOUTH AFTER USE      cyanocobalamin (VITAMIN B-12) 1000 MCG tablet 1,000 mcg.      ferrous sulfate (FEOSOL) 325 mg (65 mg iron) Tab tablet 325 mg.      glimepiride (AMARYL) 4 MG tablet 8 mg.      glipiZIDE (GLUCOTROL) 10 MG tablet 10 mg.      ibuprofen (ADVIL,MOTRIN) 800 MG tablet Take 1 tablet (800 mg total) by mouth 3 (three) times daily. (Patient not taking: Reported on 8/28/2023) 60 tablet 0    metFORMIN (GLUCOPHAGE-XR) 750 MG ER 24hr tablet 750 mg.      metFORMIN (GLUCOPHAGE-XR) 750 MG ER 24hr tablet 750 mg.      mirabegron (MYRBETRIQ) 50 mg Tb24 Take 1 tablet (50 mg total) by mouth once daily. 30 tablet 11    nicotine (NICODERM CQ) 21 mg/24 hr Place 1 patch onto the skin once daily. 28 patch 0    nicotine polacrilex 4 MG Lozg Take 1 lozenge (4 mg total) by mouth as needed (Use in place of a cigarette not to exceed 10-12 pieces in a day. Park in cheek. Do not chew. Avoid food/drink for 15 min before & after.). 432 lozenge 0    " rosuvastatin (CRESTOR) 5 MG tablet Take 1 tablet by mouth once daily.      solifenacin (VESICARE) 10 MG tablet Take 1 tablet (10 mg total) by mouth once daily. 30 tablet 11    vitamin D (VITAMIN D3) 1000 units Tab 25 mcg.      vitamin D (VITAMIN D3) 1000 units Tab 25 mcg.       No facility-administered encounter medications on file as of 2/12/2024.     No orders of the defined types were placed in this encounter.          Plan:     Problem List Items Addressed This Visit          Pulmonary    Emphysema lung     - normal sarah 6/2023  - continue breztri for now, will likely deescalate at next visit if stable and/or pt using as needed  - albuterol as needed         Relevant Medications    budesonide-glycopyr-formoterol 160-9-4.8 mcg/actuation HFAA    albuterol (PROVENTIL/VENTOLIN HFA) 90 mcg/actuation inhaler       Other    Nicotine dependence - Primary     - currently enrolled in smoking cessation  - discussed cessation   - LDCT 8/2023- lung-rads 2, next scheduled 8/2024          Other Visit Diagnoses       COPD suggested by initial evaluation              Follow up in 6 months after her LDCT.

## 2024-02-12 NOTE — PROCEDURES
"[facility] - Pulmonary Function  Six Minute Walk     SUMMARY     Ordering Provider: Dr. Feliz   Interpreting Provider: Dr. Beyer  Performing nurse/tech/RT: COLLIN Leavitt RRT  Diagnosis:  (Mixed simple/muco chronic bronchitis)  Height: 5' 9" (175.3 cm)  Weight: 90.3 kg (199 lb 1.2 oz)  BMI (Calculated): 29.4   Patient Race:             Phase Oxygen Assessment Supplemental O2 Heart   Rate Blood Pressure Roger Dyspnea Scale Rating   Resting 95 % Room Air 90 bpm 147/71 0   Exercise        Minute        1 94 % Room Air 104 bpm     2 93 % Room Air 114 bpm     3 95 % Room Air 115 bpm     4 94 % Room Air 114 bpm     5 92 % Room Air 118 bpm     6  96 % Room Air 113 bpm 140/70 0   Recovery        Minute        1 96 % Room Air 101 bpm     2 95 % Room Air 95 bpm     3 96 % Room Air 95 bpm     4 97 % Room Air 94 bpm 142/80 0     Six Minute Walk Summary  6MWT Status: completed without stopping  Patient Reported: Leg pain     Interpretation:  Did the patient stop or pause?: No                                         Total Time Walked (Calculated): 360 seconds  Final Partial Lap Distance (feet): 0 feet  Total Distance Meters (Calculated): 426.72 meters  Predicted Distance Meters (Calculated): 541.1 meters  Percentage of Predicted (Calculated): 78.86  Peak VO2 (Calculated): 16.78  Mets: 4.79  Has The Patient Had a Previous Six Minute Walk Test?: No       Previous 6MWT Results  Has The Patient Had a Previous Six Minute Walk Test?: No      Interpretation:  Total distance walked in six minutes is mildly reduced indicating a reduction in overall  functional capacity. The patient did not meet criteria for supplemental oxygen prescription.  Clinical correlation suggested.  [] Mild exercise-induced hypoxemia described as an arterial oxygen saturation of 93-95% (with a fall of 3-4% with exercise),   [x] Moderate exercise-induced hypoxemia as a fall in oxygen saturation to  89-93% (with a fall of 3-4 % with exercise)  [] Severe exercise " induced hypoxemia as < 89% O2 saturation (88% and below).  Medicare Criteria for Oxygen prescription comments: When arterial oxygen saturation is at or below 88% during exercise (severe exercise induced hypoxemia) then the patient falls under   Details about Medicare Group Criteria coverage can be found at http://www.cms.[facility].gov/manuals/downloads/     Santy Beyer MD

## 2024-02-13 PROBLEM — J43.9 EMPHYSEMA LUNG: Status: ACTIVE | Noted: 2024-02-13

## 2024-02-13 NOTE — ASSESSMENT & PLAN NOTE
- normal sarah 6/2023  - continue breztri for now, will likely deescalate at next visit if stable and/or pt using as needed  - albuterol as needed

## 2024-02-13 NOTE — ASSESSMENT & PLAN NOTE
- currently enrolled in smoking cessation  - discussed cessation   - LDCT 8/2023- lung-rads 2, next scheduled 8/2024

## 2024-02-22 ENCOUNTER — TELEPHONE (OUTPATIENT)
Dept: SMOKING CESSATION | Facility: CLINIC | Age: 49
End: 2024-02-22
Payer: OTHER GOVERNMENT

## 2024-02-22 DIAGNOSIS — F17.200 NICOTINE DEPENDENCE: Primary | ICD-10-CM

## 2024-02-22 NOTE — TELEPHONE ENCOUNTER
1st attempt left message regarding smoking cessation 6 month telephone follow up for quit 2 episode.

## 2024-02-26 ENCOUNTER — CLINICAL SUPPORT (OUTPATIENT)
Dept: SMOKING CESSATION | Facility: CLINIC | Age: 49
End: 2024-02-26
Payer: COMMERCIAL

## 2024-02-26 DIAGNOSIS — F17.200 NICOTINE DEPENDENCE: Primary | ICD-10-CM

## 2024-02-26 PROCEDURE — 99999 PR PBB SHADOW E&M-EST. PATIENT-LVL I: CPT | Mod: PBBFAC,,,

## 2024-02-26 PROCEDURE — 99404 PREV MED CNSL INDIV APPRX 60: CPT | Mod: S$GLB,,, | Performed by: SPEECH-LANGUAGE PATHOLOGIST

## 2024-02-26 PROCEDURE — 99999 PR PBB SHADOW E&M-EST. PATIENT-LVL II: CPT | Mod: PBBFAC,,, | Performed by: SPEECH-LANGUAGE PATHOLOGIST

## 2024-02-26 PROCEDURE — 99407 BEHAV CHNG SMOKING > 10 MIN: CPT | Mod: S$GLB,,,
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
## 2024-02-26 NOTE — PROGRESS NOTES
"Spoke with patient today in regard to smoking cessation progress for 6 month telephone follow up, she states not tobacco free. Patient states "I don't want to do this". Informed patient of benefit period, future follow ups, and contact information if any further help or support is needed. Patient has no interest at this time. Will complete smart form for 3 and 6 month follow up on Quit attempt #2.     "

## 2024-03-25 ENCOUNTER — CLINICAL SUPPORT (OUTPATIENT)
Dept: SMOKING CESSATION | Facility: CLINIC | Age: 49
End: 2024-03-25
Payer: COMMERCIAL

## 2024-03-25 DIAGNOSIS — F17.200 NICOTINE DEPENDENCE: Primary | ICD-10-CM

## 2024-03-25 PROCEDURE — 99404 PREV MED CNSL INDIV APPRX 60: CPT | Mod: S$GLB,,, | Performed by: SPEECH-LANGUAGE PATHOLOGIST

## 2024-03-25 PROCEDURE — 99999 PR PBB SHADOW E&M-EST. PATIENT-LVL II: CPT | Mod: PBBFAC,,, | Performed by: SPEECH-LANGUAGE PATHOLOGIST

## 2024-03-25 NOTE — PROGRESS NOTES
Individual Follow-Up Form    3/25/2024    Quit Date: TBD    Clinical Status of Patient: Outpatient    Length of Service: 60 minutes    Continuing Medication: yes  Patches 21 mg, 4 mg lozenges, inhaler     Other Medications:      Target Symptoms: Withdrawal and medication side effects. The following were  rated moderate (3) to severe (4) on TCRS:  Moderate (3):   Severe (4):     Comments: Telephone visit at the patient's request due to she states she forgot. Patient reports she is dealing with a lot of stress due to her upcoming divorce which will be finalized on 4/2/2024 & her concerns about the ability to support herself due to she is a disabled . She reports she hasn't been able to focus on her quit & states she was smoking due to her anxiety & fear surrounding the upcoming situation. Patient reports she continues to see & meet with her therapist. Patient has access to NRT of 21 mg patches, 4 mg lozenges & an inhaler. NRT is not being consistently used at this time. Active listening & emotional support provided. Follow up set for 4/16/2024 at 3:00 pm.     Diagnosis: F17.200    Next Visit: 3 weeks

## 2024-04-16 ENCOUNTER — CLINICAL SUPPORT (OUTPATIENT)
Dept: SMOKING CESSATION | Facility: CLINIC | Age: 49
End: 2024-04-16
Payer: COMMERCIAL

## 2024-04-16 DIAGNOSIS — F17.200 NICOTINE DEPENDENCE: Primary | ICD-10-CM

## 2024-04-16 PROCEDURE — 99999 PR PBB SHADOW E&M-EST. PATIENT-LVL II: CPT | Mod: PBBFAC,,, | Performed by: SPEECH-LANGUAGE PATHOLOGIST

## 2024-04-16 PROCEDURE — 99404 PREV MED CNSL INDIV APPRX 60: CPT | Mod: S$GLB,,, | Performed by: SPEECH-LANGUAGE PATHOLOGIST

## 2024-04-16 RX ORDER — VARENICLINE TARTRATE 1 MG/1
TABLET, FILM COATED ORAL
Qty: 56 TABLET | Refills: 0 | Status: SHIPPED | OUTPATIENT
Start: 2024-04-16

## 2024-04-16 NOTE — PROGRESS NOTES
Individual Follow-Up Form    4/16/2024    Quit Date: TBD     Clinical Status of Patient: Outpatient     Length of Service: 60 minutes     Continuing Medication: yes  Patches 21 mg, 4 mg lozenges, inhaler (not utilizing)     Continuing Medication: ordered Varenicline this date     Target Symptoms: Withdrawal and medication side effects. The following were  rated moderate (3) to severe (4) on TCRS:  Moderate (3):   Severe (4):     Comments: Telephone visit at the patient's request. Patient reports she hasn't been using her NRT & reports she has increased her smoking due to dealing with grief about her life & her divorce that will be finalized this month. She feels she is smoking around 2 packs per day. Active listening & emotional support provided to the patient regarding the situation she reports she's been dealing with.Discussed NRT options. Patient is open to taking Varenicline. CTTS ordered this date. Education on Varenicline instructions & CTTS will mail patient a copy in addition to emailing a copy as well as the copy the patient received at [facility] due to the patient doesn't have a portal.  Patient reports she has noticed an increase choking cough since her smoking has increased & she is tired of it. Patient reports knowledge that she received from CTTS & her therapist allowed her to make it through the process that she had to go through regarding her divorce & states that the cigarettes didn't help her get through anything & has resulted in this choking cough that she has.  Patient's goal is to begin taking Varenicline to achieve a quit. Follow up set for 5/13/2024 at 3:00 pm.     Diagnosis: F17.200    Next Visit: 4 weeks

## 2024-05-13 ENCOUNTER — CLINICAL SUPPORT (OUTPATIENT)
Dept: SMOKING CESSATION | Facility: CLINIC | Age: 49
End: 2024-05-13
Payer: COMMERCIAL

## 2024-05-13 DIAGNOSIS — F17.200 NICOTINE DEPENDENCE: Primary | ICD-10-CM

## 2024-05-13 PROCEDURE — 99404 PREV MED CNSL INDIV APPRX 60: CPT | Mod: S$GLB,,, | Performed by: SPEECH-LANGUAGE PATHOLOGIST

## 2024-05-13 PROCEDURE — 99999 PR PBB SHADOW E&M-EST. PATIENT-LVL II: CPT | Mod: PBBFAC,,, | Performed by: SPEECH-LANGUAGE PATHOLOGIST

## 2024-05-13 RX ORDER — IBUPROFEN 200 MG
1 TABLET ORAL DAILY
Qty: 28 PATCH | Refills: 0 | Status: SHIPPED | OUTPATIENT
Start: 2024-05-13

## 2024-05-13 NOTE — PROGRESS NOTES
"Individual Follow-Up Form    5/13/2024    Quit Date: TBD    Clinical Status of Patient: Outpatient    Length of Service: 60 minutes    Continuing Medication: yes  Chantix, Patches 21 mg, 4 mg lozenges, inhaler     Other Medications:      Target Symptoms: Withdrawal and medication side effects. The following were  rated moderate (3) to severe (4) on TCRS:  Moderate (3):   Severe (4):     Comments: Patient seen in clinic. Patient reports she started Varenicline & then got sick with a sinus infection so she stopped taking it due to being on the antiobiotics. She reports she hadn't made it to the 1 pill twice daily yet due to getting the sinus infection. She reports she has restarted taking Varenicline. Patient reports she went 30 hours without smoking & was able to complete 2 quit attempts around 30+ hours. Commended the patient on her efforts.  Assessed CO with patient reporting smoking 3 little cigars prior. CO 30. Patient states she was able to achieve those quits utilizing all of her tools. She reports despite the things she has been dealing with that she is wanting to quit smoking & that she is looking forward to the day that she breaks them into little pieces & throws them away. Patient is willing to read the book "Nicotine Confesses" for additional insight & information with the information discussed this date on what occurs in the body with nicotine usage & with a quit & will return it to CTTS. Discussed what happens to the body during the withdrawal stage & the stages that occur for the patient to have an idea of what she may experience. Discussed having a plan of at least 2 things the patient can do instead of smoking which the patient enjoys being outside in her backyard & on her patio. Patient reports she is able to see herself as a non smoker & wants to be able to wear her perfume & smell nice & no longer smell of cigarette smoke.Patient's goal is to get back to being consistent with utilizing her NRT " tools to achieve a quit. Follow up set for 6/12/2024 at 3:00 pm.     Diagnosis: F17.200    Next Visit: 4 weeks

## 2024-06-12 ENCOUNTER — CLINICAL SUPPORT (OUTPATIENT)
Dept: SMOKING CESSATION | Facility: CLINIC | Age: 49
End: 2024-06-12
Payer: COMMERCIAL

## 2024-06-12 DIAGNOSIS — F17.200 NICOTINE DEPENDENCE: Primary | ICD-10-CM

## 2024-06-12 PROCEDURE — 99999 PR PBB SHADOW E&M-EST. PATIENT-LVL II: CPT | Mod: PBBFAC,,, | Performed by: SPEECH-LANGUAGE PATHOLOGIST

## 2024-06-12 PROCEDURE — 99404 PREV MED CNSL INDIV APPRX 60: CPT | Mod: S$GLB,,, | Performed by: SPEECH-LANGUAGE PATHOLOGIST
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
## 2024-07-16 ENCOUNTER — TELEPHONE (OUTPATIENT)
Dept: SMOKING CESSATION | Facility: CLINIC | Age: 49
End: 2024-07-16
Payer: OTHER GOVERNMENT

## 2024-08-01 ENCOUNTER — HOSPITAL ENCOUNTER (OUTPATIENT)
Dept: RADIOLOGY | Facility: HOSPITAL | Age: 49
Discharge: HOME | End: 2024-08-01
Attending: HOSPITALIST
Payer: OTHER GOVERNMENT

## 2024-08-01 ENCOUNTER — TELEPHONE (OUTPATIENT)
Dept: PULMONOLOGY | Facility: CLINIC | Age: 49
End: 2024-08-01

## 2024-08-01 ENCOUNTER — OFFICE VISIT (OUTPATIENT)
Dept: PULMONOLOGY | Facility: CLINIC | Age: 49
End: 2024-08-01
Payer: OTHER GOVERNMENT

## 2024-08-01 VITALS
HEIGHT: 69 IN | DIASTOLIC BLOOD PRESSURE: 84 MMHG | HEART RATE: 81 BPM | SYSTOLIC BLOOD PRESSURE: 120 MMHG | BODY MASS INDEX: 28.93 KG/M2 | OXYGEN SATURATION: 98 % | WEIGHT: 195.31 LBS | RESPIRATION RATE: 16 BRPM

## 2024-08-01 DIAGNOSIS — F17.210 SMOKING GREATER THAN 30 PACK YEARS: ICD-10-CM

## 2024-08-01 DIAGNOSIS — J44.9 COPD SUGGESTED BY INITIAL EVALUATION: Primary | ICD-10-CM

## 2024-08-01 DIAGNOSIS — J45.20 MILD INTERMITTENT ASTHMA WITHOUT COMPLICATION: Primary | ICD-10-CM

## 2024-08-01 DIAGNOSIS — F17.210 TOBACCO DEPENDENCE DUE TO CIGARETTES: ICD-10-CM

## 2024-08-01 DIAGNOSIS — F17.210 CIGARETTE NICOTINE DEPENDENCE WITHOUT COMPLICATION: ICD-10-CM

## 2024-08-01 DIAGNOSIS — J45.20 MILD INTERMITTENT ASTHMA WITHOUT COMPLICATION: ICD-10-CM

## 2024-08-01 PROCEDURE — 71271 CT THORAX LUNG CANCER SCR C-: CPT | Mod: 26,,, | Performed by: RADIOLOGY

## 2024-08-01 PROCEDURE — 71271 CT THORAX LUNG CANCER SCR C-: CPT | Mod: TC

## 2024-08-01 PROCEDURE — 99999 PR PBB SHADOW E&M-EST. PATIENT-LVL V: CPT | Mod: PBBFAC,,, | Performed by: HOSPITALIST

## 2024-08-01 PROCEDURE — 99215 OFFICE O/P EST HI 40 MIN: CPT | Mod: PBBFAC,25 | Performed by: HOSPITALIST

## 2024-08-01 RX ORDER — ALBUTEROL SULFATE 0.83 MG/ML
2.5 SOLUTION RESPIRATORY (INHALATION) EVERY 6 HOURS PRN
Qty: 90 ML | Refills: 0 | Status: SHIPPED | OUTPATIENT
Start: 2024-08-01 | End: 2025-08-01

## 2024-08-01 RX ORDER — FLUTICASONE PROPIONATE AND SALMETEROL 250; 50 UG/1; UG/1
1 POWDER RESPIRATORY (INHALATION) 2 TIMES DAILY
Qty: 60 EACH | Refills: 5 | Status: SHIPPED | OUTPATIENT
Start: 2024-08-01 | End: 2025-08-01

## 2024-08-01 NOTE — TELEPHONE ENCOUNTER
Patient came in to get another order for her Neb machine. [facility] stated that [facility] rejected [facility] that we gave her. Elli physically signed the order and I gave her a card with the fax number so the [facility] can fax any forms that maybe needed to us. She then requested that we [facility] the order to the [facility] from the number she gave me.  I faxed the order then gave her the printed order as well as a copy of the faxed successful document.

## 2024-08-01 NOTE — TELEPHONE ENCOUNTER
Called pharmacy back they were calling regarding Diana Meneses and her albuterol medication. They did not see the e-prescription that was sent over. Pharmacist found it as we were discussing it.----- Message from Emmie Elizondo sent at 8/1/2024  1:58 PM CDT -----  Type:  Pharmacy Calling to Clarify an RX    Name of Caller:Sha  Pharmacy Name:[facility]  Prescription Name:nebulizer kit   What do they need to clarify?:he has a question/he wouldn't leave what the question was  Best Call Back Number:[phone]  Additional Information: .    Thank you

## 2024-08-01 NOTE — ASSESSMENT & PLAN NOTE
- prior testing negative for obstruction  - does report wheezing when lying down and benefit with breathing treatments in the past  - stepping down breztri to [facility]  - nebulizer prescribed ([facility] for [facility])

## 2024-08-01 NOTE — ASSESSMENT & PLAN NOTE
- current, about pack per day  - enrolled in smoking cessation  - ldct reviewed with pt- official read pending

## 2024-08-01 NOTE — PROGRESS NOTES
Subjective:      Patient ID: Diana Meneses is a 49 y.o. female.    Chief Complaint: Tobacco Use    Interval Hx 8/1/24:    Mrs. Meneses presents to Pulmonary clinic for follow up tobacco use and LDCT. She was last seen 2/12/24- was continued on breztri and albuterol as needed.     She has been doing ok, intermittently using breztri. Wheezes occasionally when lying down at night, phlegm in the mornings.    Pertinent Work Up:  - LDCT 8/1/24- No nodules or abnormalities to follow, official read pending    Pulmonary Interventions:  - Breztri- not using consistently   - Albuterol- doesn't feel like it helps with nocturnal wheeze    Smoking: Still smoking, about pack per day    HPI 2/12/24:     48 year old female with history of HLD, tobacco use who presents to Pulmonary clinic for routine follow up. She was last seen in clinic 8/2023 by Dr. CARROL Feliz- at that visit she was continued on Breztri, was referred to smoking cessation.      She has been staying with smoking cessation and thinks that April has given her a lot of good information, but has only been able to cut down from a pack a day to 18-20 cigarettes per day. Had a sinus infection in December and is now being treated for C. Diff. With a change in her brand and nicotine concentration of cigarettes she has noticed a decrease in sputum production.      Pertinent Work Up:  6MW 2/12/24- Resting sat 95%, SpO2 range 92-97%, no pauses, 426m (78% predicted)  LDCT 8/2023- Lung rads 2, no nodules, minimal emphysema     Pulmonary Interventions:   Albuterol HFA  Breztri- using as needed     Smoking hx: Switched to lower nicotine cigarettes, still smoking around 18 cigarettes per day    Review of Systems   Respiratory:  Positive for sputum production and wheezing. Negative for cough.      Objective:     Physical Exam   Constitutional: She is oriented to person, place, and time. She appears well-developed and well-nourished. She is not obese.   Cardiovascular: Normal rate and  "regular rhythm.   Pulmonary/Chest: Normal expansion, effort normal and breath sounds normal.   Neurological: She is alert and oriented to person, place, and time.   Skin: Skin is warm and dry.     Personal Diagnostic Review  As Above      2/12/2024     3:10 PM 2/12/2024     3:00 PM 9/11/2023     2:00 PM 8/7/2023     3:21 PM 6/22/2023    10:40 AM 6/5/2023     1:53 PM 11/1/2022     2:19 PM   Pulmonary Function Tests   SpO2 99 %   96 %  97 %    Ordering Provider  Dr. Feliz        Performing nurse/tech/RT  C. Credeur, RRT        Height 5' 9" (1.753 m) 5' 9" (1.753 m) 5' 9" (1.753 m) 5' 9" (1.753 m)  5' 9" (1.753 m) 5' 9" (1.753 m)   Weight 90.3 kg (199 lb) 90.3 kg (199 lb 1.2 oz) 91.2 kg (201 lb 1 oz) 91.4 kg (201 lb 9.8 oz) 94.5 kg (208 lb 5.4 oz) 94 kg (207 lb 5.5 oz) 86.2 kg (190 lb)   BMI (Calculated) 29.4 29.4 29.7 29.8  30.6 28   Patient Race          6MWT Status  completed without stopping        Patient Reported  Leg pain        Was O2 used?  No        6MW Distance walked (feet)  1400 feet        Distance walked (meters)  426.72 meters        Did patient stop?  No        Type of assistive device(s) used?  no assistive devices        Oxygen Saturation  95 %        Supplemental Oxygen  Room Air        Heart Rate  90 bpm        Blood Pressure  147/71        Roger Dyspnea Rating   nothing at all        Oxygen Saturation  96 %        Supplemental Oxygen  Room Air        Heart Rate  113 bpm        Blood Pressure  140/70        Roger Dyspnea Rating   nothing at all        Recovery Time (seconds)  240 seconds        Oxygen Saturation  97 %        Supplemental Oxygen  Room Air        Heart Rate  94 bpm        Blood Pressure  142/80        Roger Dyspnea Rating   nothing at all        Is procedure ready for interpretation?  Yes        Oxygen Qualification?  No             Assessment:     No diagnosis found.     Outpatient Encounter Medications as of 8/1/2024   Medication Sig Dispense Refill    albuterol " (PROVENTIL/VENTOLIN HFA) 90 mcg/actuation inhaler INHALE 2 PUFFS BY MOUTH EVERY SIX HOURS AS NEEDED FOR WHEEZING. RESCUE      albuterol (PROVENTIL/VENTOLIN HFA) 90 mcg/actuation inhaler Inhale 2 puffs into the lungs every 6 (six) hours as needed for Wheezing. Rescue 18 g 5    alogliptin (NESINA) 25 mg Tab Take 1 tablet by mouth once daily.      budesonide-glycopyr-formoterol 160-9-4.8 mcg/actuation HFAA Inhale 2 puffs into the lungs 2 (two) times daily. Wash out mouth after use. 10.7 g 11    budesonide-glycopyr-formoterol 160-9-4.8 mcg/actuation HFAA INHALE TWO INHALATIONS BY MOUTH TWICE A DAY INTO THE LUNGS;WASH OUT MOUTH AFTER USE 10.7 g 5    camphor-menthol 0.5-0.5% (SARNA) lotion APPLY TO AFFECTED AREAS OF LEGS LIBERAL AMOUNT TOPICALLY TWICE A DAY AS NEEDED FOR ITCHING MAY USE REFRIGERATED FOR ADDITIONAL RELIEF      cyanocobalamin (VITAMIN B-12) 1000 MCG tablet 1,000 mcg.      ferrous sulfate (FEOSOL) 325 mg (65 mg iron) Tab tablet 325 mg.      fidaxomicin (DIFICID) 200 mg Tab 200 mg.      glimepiride (AMARYL) 4 MG tablet 8 mg.      glipiZIDE (GLUCOTROL) 10 MG tablet 10 mg.      ibuprofen (ADVIL,MOTRIN) 800 MG tablet Take 1 tablet (800 mg total) by mouth 3 (three) times daily. 60 tablet 0    metFORMIN (GLUCOPHAGE-XR) 750 MG ER 24hr tablet 750 mg.      metFORMIN (GLUCOPHAGE-XR) 750 MG ER 24hr tablet 750 mg.      mirabegron (MYRBETRIQ) 50 mg Tb24 Take 1 tablet (50 mg total) by mouth once daily. 30 tablet 11    nicotine (NICODERM CQ) 21 mg/24 hr Place 1 patch onto the skin once daily. 28 patch 0    nicotine polacrilex 4 MG Lozg Take 1 lozenge (4 mg total) by mouth as needed (Use in place of a cigarette not to exceed 10-12 pieces in a day. Park in cheek. Do not chew. Avoid food/drink for 15 min before & after.). 432 lozenge 0    ondansetron (ZOFRAN-ODT) 4 MG TbDL 4 mg.      rosuvastatin (CRESTOR) 5 MG tablet Take 1 tablet by mouth once daily.      solifenacin (VESICARE) 10 MG tablet Take 1 tablet (10 mg total) by  mouth once daily. 30 tablet 11    varenicline (CHANTIX) 1 mg Tab On days 1-3, take 1/2 tablet once daily. On days 4-7, take 1/2 tablet twice daily On days 8 thru the rest of the prescription, take 1 tablet twice daily. Take with full glass of water & with food to avoid nausea 56 tablet 0    vitamin D (VITAMIN D3) 1000 units Tab 25 mcg.      vitamin D (VITAMIN D3) 1000 units Tab 25 mcg.       No facility-administered encounter medications on file as of 8/1/2024.     No orders of the defined types were placed in this encounter.        Plan:     Problem List Items Addressed This Visit          Pulmonary    Mild intermittent asthma without complication     - prior testing negative for obstruction  - does report wheezing when lying down and benefit with breathing treatments in the past  - stepping down breztri to ICS-LABA  - [facility])         Relevant Medications    fluticasone-salmeterol diskus inhaler 250-50 mcg       Other    Nicotine dependence     - current, about pack per day  - enrolled in smoking cessation  - ldct reviewed with pt- official read pending          Other Visit Diagnoses       COPD suggested by initial evaluation    -  Primary    Relevant Medications    albuterol (PROVENTIL) 2.5 mg /3 mL (0.083 %) nebulizer solution    Other Relevant Orders    NEBULIZER FOR HOME USE    NEBULIZER KIT (SUPPLIES) FOR HOME USE          Follow up in 6 months or sooner as needed.

## 2024-08-07 ENCOUNTER — TELEPHONE (OUTPATIENT)
Dept: SMOKING CESSATION | Facility: CLINIC | Age: 49
End: 2024-08-07
Payer: OTHER GOVERNMENT

## 2024-08-07 DIAGNOSIS — F17.200 NICOTINE DEPENDENCE: Primary | ICD-10-CM

## 2024-08-14 ENCOUNTER — CLINICAL SUPPORT (OUTPATIENT)
Dept: SMOKING CESSATION | Facility: CLINIC | Age: 49
End: 2024-08-14
Payer: COMMERCIAL

## 2024-08-14 DIAGNOSIS — F17.200 NICOTINE DEPENDENCE: Primary | ICD-10-CM

## 2024-08-14 PROCEDURE — 99999 PR PBB SHADOW E&M-EST. PATIENT-LVL II: CPT | Mod: PBBFAC,,, | Performed by: SPEECH-LANGUAGE PATHOLOGIST

## 2024-08-14 PROCEDURE — 99404 PREV MED CNSL INDIV APPRX 60: CPT | Mod: S$GLB,,, | Performed by: SPEECH-LANGUAGE PATHOLOGIST

## 2024-08-14 RX ORDER — IBUPROFEN 200 MG
1 TABLET ORAL DAILY
Qty: 28 PATCH | Refills: 0 | Status: SHIPPED | OUTPATIENT
Start: 2024-08-14

## 2024-08-14 RX ORDER — ASPIRIN/CALCIUM CARB/MAGNESIUM 325 MG
4 TABLET ORAL
Qty: 432 LOZENGE | Refills: 0 | Status: SHIPPED | OUTPATIENT
Start: 2024-08-14

## 2024-08-14 NOTE — PROGRESS NOTES
Individual Follow-Up Form    8/14/2024    Quit Date: TBD     Clinical Status of Patient: Outpatient     Length of Service: 60 minutes     Continuing Medication: yes  Chantix, Patches 21 mg, 4 mg lozenges, inhaler     Other Medications:      Target Symptoms: Withdrawal and medication side effects. The following were  rated moderate (3) to severe (4) on TCRS:  Moderate (3):   Severe (4):     Comments: Patient seen in clinic. She reports she has reduced her smoking; but is unsure of exactly how many she is smoking. She reports she is using her 21 mg patches & 4 mg lozenges; but that she has forgotten to take Varenicline or use the inhaler. She reports she plans on getting in a better, consistent routine with dosage. Discussed placing the Varenicline in an area with something that she does every day without thinking about it. Patient is willing to apply. Assessed CO.  CO 22 which is a significant reduction in the patient's CO levels over the course of care. Patient reports she has made a healthy lifestyle change with her eating habits & has been attending a gym 5 days a week where she spends hours at a time there without smoking over the last 6 wks. She reports she spends a minimum of 3 hours daily with a recent trip of over 7 hrs due to falling asleep while laying out without smoking where normally this time frame the patient would have smoked 6-8 cigarettes. She states she realized that she could go outside & smoke if she wanted to; but that she doesn't want to do that & that she has enjoyed engaging in her fitness classes & routine without thinking about smoking. Patient states she attends water aerobics & then goes into the weight room for her routine. She states she has recently started to use the elliptical machine where she was only able to do 5 min; but is now able to do 15 mins. Patient reports she has moved past the deep depression/grief place that she has been in for months where she wasn't focused on her  quit, her health or her wellbeing. She reports this experience has her motivated to get back to her fitness lifestyle that she had achieved 10 yrs ago & she quit smoking during that time due to she wasn't able to complete the aerobic exercises while smoking. She reports she has a plan to get back into this lifestyle which includes being a non smoker. Patient's goal is to get back on track with taking her NRT to cut down to achieve her quit. Follow up set for 9/9/2024 at 3:00 pm.     Diagnosis: F17.200    Next Visit: 4 weeks

## 2024-09-09 ENCOUNTER — CLINICAL SUPPORT (OUTPATIENT)
Dept: SMOKING CESSATION | Facility: CLINIC | Age: 49
End: 2024-09-09
Payer: COMMERCIAL

## 2024-09-09 DIAGNOSIS — F17.200 NICOTINE DEPENDENCE: Primary | ICD-10-CM

## 2024-09-09 PROCEDURE — 99404 PREV MED CNSL INDIV APPRX 60: CPT | Mod: S$GLB,,, | Performed by: SPEECH-LANGUAGE PATHOLOGIST

## 2024-09-09 PROCEDURE — 99999 PR PBB SHADOW E&M-EST. PATIENT-LVL I: CPT | Mod: PBBFAC,,, | Performed by: SPEECH-LANGUAGE PATHOLOGIST
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
## 2024-10-08 ENCOUNTER — CLINICAL SUPPORT (OUTPATIENT)
Dept: SMOKING CESSATION | Facility: CLINIC | Age: 49
End: 2024-10-08
Payer: COMMERCIAL

## 2024-10-08 DIAGNOSIS — F17.200 NICOTINE DEPENDENCE: Primary | ICD-10-CM

## 2024-10-08 PROCEDURE — 99999 PR PBB SHADOW E&M-EST. PATIENT-LVL II: CPT | Mod: PBBFAC,,, | Performed by: SPEECH-LANGUAGE PATHOLOGIST

## 2024-10-08 PROCEDURE — 99404 PREV MED CNSL INDIV APPRX 60: CPT | Mod: S$GLB,,, | Performed by: SPEECH-LANGUAGE PATHOLOGIST

## 2024-10-08 RX ORDER — VARENICLINE TARTRATE 1 MG/1
TABLET, FILM COATED ORAL
Qty: 60 TABLET | Refills: 0 | Status: SHIPPED | OUTPATIENT
Start: 2024-10-08

## 2024-10-08 RX ORDER — IBUPROFEN 200 MG
1 TABLET ORAL DAILY
Qty: 30 PATCH | Refills: 0 | Status: SHIPPED | OUTPATIENT
Start: 2024-10-08

## 2024-10-08 NOTE — PROGRESS NOTES
Individual Follow-Up Form    10/8/2024    Quit Date: TBD    Clinical Status of Patient: Outpatient    Length of Service: 60 minutes    Continuing Medication: yes  Patches 21 mg CLEAR, 4 mg lozenge & Chantix (hasn't restarted using it)    Other Medications:      Target Symptoms: Withdrawal and medication side effects. The following were  rated moderate (3) to severe (4) on TCRS:  Moderate (3):   Severe (4):     Comments: Patient seen in clinic.  She reports she is smoking the same at 10 cpd or less due to the [place] brown [place] patches are not sticking to her; but that the clear patches do; but she doesn't want [place] products stating they don't work. CTTS contacted [facility] & the pharmacist states they are on backorder. CTTS contacted [facility] who states they have clear [place] brand & patient states she is willing to try them. Assessed CO with patient unsure of how many specifically; but she knows it was less than 10. CO 17. Discussed NRT usage. Patient states she hasn't resumed using Varenicline again due to her routine. Patient reports she applied strategies discussed last session & has made other changes to her life & routine which allows her to go for several hours without smoking. She reports when she has a patch, she can go for 5 hours without smoking; however the current brown patches aren't working on her which impacts her quit. Re-educated patient on the role of Varenicline & she would not have to utilize the patches with that medication. Discussed setting up the routine for the patient. Patient declined resuming it from the 1/2 tab once daily for days 1-3 to 1/2 tablet twice daily on days 4-7 to 1 tablet twice daily on day 8. Patient states she will put a tablet in her pill box with her other pills & go from there. Discussed moving cigarettes out of reach in her car to deter her from automatically reaching for them. Discussed triggers. Patient reports she smokes the most at home. Explored activities  the patient could engage in to keep herself busy. Patient is open to trying jigsaw puzzles. CTTS provided the patient a written copy of triggers at a glance, trigger tracker, romancing the cigarette & a meditation for beginner exercise to reference & apply. Patient reports she is willing to restart taking Varenicline & apply strategies discussed this date to work for her quit. Follow up set for 11/12/2024 at 3:00 pm.       Diagnosis: F17.200    Next Visit: 5 weeks

## 2024-11-06 ENCOUNTER — TELEPHONE (OUTPATIENT)
Dept: SMOKING CESSATION | Facility: CLINIC | Age: 49
End: 2024-11-06
Payer: OTHER GOVERNMENT

## 2024-11-06 ENCOUNTER — CLINICAL SUPPORT (OUTPATIENT)
Dept: SMOKING CESSATION | Facility: CLINIC | Age: 49
End: 2024-11-06
Payer: COMMERCIAL

## 2024-11-06 DIAGNOSIS — F17.200 NICOTINE DEPENDENCE: Primary | ICD-10-CM

## 2024-11-06 PROCEDURE — 99999 PR PBB SHADOW E&M-EST. PATIENT-LVL II: CPT | Mod: PBBFAC,,, | Performed by: SPEECH-LANGUAGE PATHOLOGIST

## 2024-11-06 PROCEDURE — 99404 PREV MED CNSL INDIV APPRX 60: CPT | Mod: S$GLB,,, | Performed by: SPEECH-LANGUAGE PATHOLOGIST

## 2024-11-06 NOTE — TELEPHONE ENCOUNTER
Patient text to reschedule her appt on 11/12 at 3 pm due [facility]. Rescheduled for 11/6/2024 at 1:30 pm.

## 2024-11-06 NOTE — PROGRESS NOTES
Individual Follow-Up Form    11/6/2024    Quit Date: TBD    Clinical Status of Patient: Outpatient    Length of Service: 60 minutes    Continuing Medication: yes 21 mg patches (CLEAR ONLY), 4 mg lozenges,  & Chantix (hasn't restarted using it)     Other Medications:      Target Symptoms: Withdrawal and medication side effects. The following were  rated moderate (3) to severe (4) on TCRS:  Moderate (3): hiccups  Severe (4): none    Comments: Delayed time stamp due to looking to include the patient's preferred name of Marti in her chart.  Patient seen in clinic. She reports she hasn't been taking Varenicline stating she's been dealing with a lot due to she's been in pain & is scheduled for a MRI on Tuesday. She reports the pain has been limiting her ability to function & go to the gym as well as she has been having issues with her blood sugar levels which resulted in medication changes. She states she didn't want to start taking anything until she had that straightened out due to she didn't want anything to interfere with it. Educated patient on the impact of smoking on A1C levels as well as there are no contraindications for Varenicline & her other medications. She received 21 mg patches; but it wasn't the clear ones despite [facility] pharmacy stating they had them. She reports she has been using the brown patches since that was all she has access to. Patient would like the clear patches & CTTS will contact additional [facility] locations in attempt to locate them. She also reports using the 4 mg lozenges. She reports she is smoking less than 10 little cigars per day. Assessed CO with patient reporting 6 prior. CO 54. Re-educated patient on the higher level of CO with cigars due to the wrapped in tobacco. Re-educated patient on the role of the NRT & that if she takes Varenicline then she would not have to worry about the patch style/brand. Discussed placing the Varenicline with items that she uses daily such as in her  bathroom and/or next to her bed on the nightstand so that when the patient is only able to lay in her bed due to her back pain, she would have access to the medication. Patient verbalized willingness to apply. Patient states her ultimate goal is to be smoke free. Follow up set for 12/17/2024 at 3:00 pm.     Diagnosis: F17.200    Next Visit: 6 weeks

## 2024-11-07 DIAGNOSIS — F17.200 NICOTINE DEPENDENCE: ICD-10-CM

## 2024-11-07 RX ORDER — IBUPROFEN 200 MG
1 TABLET ORAL DAILY
Qty: 28 PATCH | Refills: 0 | Status: SHIPPED | OUTPATIENT
Start: 2024-11-07

## 2024-12-17 ENCOUNTER — CLINICAL SUPPORT (OUTPATIENT)
Dept: SMOKING CESSATION | Facility: CLINIC | Age: 49
End: 2024-12-17
Payer: COMMERCIAL

## 2024-12-17 DIAGNOSIS — F17.200 NICOTINE DEPENDENCE: Primary | ICD-10-CM

## 2024-12-17 PROCEDURE — 99404 PREV MED CNSL INDIV APPRX 60: CPT | Mod: S$GLB,,, | Performed by: SPEECH-LANGUAGE PATHOLOGIST

## 2024-12-17 PROCEDURE — 99999 PR PBB SHADOW E&M-EST. PATIENT-LVL II: CPT | Mod: PBBFAC,,, | Performed by: SPEECH-LANGUAGE PATHOLOGIST

## 2024-12-17 RX ORDER — VARENICLINE TARTRATE 1 MG/1
TABLET, FILM COATED ORAL
Qty: 60 TABLET | Refills: 0 | Status: SHIPPED | OUTPATIENT
Start: 2024-12-17

## 2024-12-17 NOTE — PROGRESS NOTES
Individual Follow-Up Form    12/17/2024    Quit Date: TBD    Clinical Status of Patient: Outpatient    Length of Service: 60 minutes    Continuing Medication: yes  21 mg patches (CLEAR ONLY), 4 mg lozenges,  & Chantix     Other Medications:      Target Symptoms: Withdrawal and medication side effects. The following were  rated moderate (3) to severe (4) on TCRS:  Moderate (3):   Severe (4):     Comments: Patient seen in clinic. Patient reports she hasn't achieved her quit; but is smoking under 10 cpd which is significant achievement for the patient. Assessed CO with patient reported smoking 4 little cigars prior. CO 20. She reports she is utilizing Varenicline & taking 2 mg at once at night which she finds effective & she states she doesn't smoke the entire night when she does that. She reports she also utilizes her 21 mg clear patches. She reports she hasn't been utilizing the lozenges due to the heartburn. Discussed with patient the option of trying a mini lozenge & cherry flavor option. Patient is willing to try a sample & will report back. Patient states she smokes in the morning. Discussed routine change that can include using the lozenge in the morning & then go to the gym where she spends at least 2 hrs which is also extending the time to achieve her quit. Patient is willing to apply. Patient states her goal is to be smoke free. Follow up set 1/22/2025 at 3:00 pm.       Diagnosis: F17.200    Next Visit: 5 weeks

## 2025-01-20 ENCOUNTER — TELEPHONE (OUTPATIENT)
Dept: SMOKING CESSATION | Facility: CLINIC | Age: 50
End: 2025-01-20
Payer: OTHER GOVERNMENT
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
## 2025-01-22 ENCOUNTER — CLINICAL SUPPORT (OUTPATIENT)
Dept: SMOKING CESSATION | Facility: CLINIC | Age: 50
End: 2025-01-22
Payer: COMMERCIAL

## 2025-01-22 DIAGNOSIS — F17.200 NICOTINE DEPENDENCE: Primary | ICD-10-CM

## 2025-01-22 PROCEDURE — 99999 PR PBB SHADOW E&M-EST. PATIENT-LVL II: CPT | Mod: PBBFAC,,, | Performed by: SPEECH-LANGUAGE PATHOLOGIST

## 2025-01-22 PROCEDURE — 99404 PREV MED CNSL INDIV APPRX 60: CPT | Mod: S$GLB,,, | Performed by: SPEECH-LANGUAGE PATHOLOGIST

## 2025-01-22 RX ORDER — VARENICLINE TARTRATE 1 MG/1
TABLET, FILM COATED ORAL
Qty: 60 TABLET | Refills: 0 | Status: SHIPPED | OUTPATIENT
Start: 2025-01-22
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
## 2025-02-26 ENCOUNTER — CLINICAL SUPPORT (OUTPATIENT)
Dept: SMOKING CESSATION | Facility: CLINIC | Age: 50
End: 2025-02-26
Payer: COMMERCIAL

## 2025-02-26 DIAGNOSIS — F17.200 NICOTINE DEPENDENCE: Primary | ICD-10-CM

## 2025-02-26 PROCEDURE — 99404 PREV MED CNSL INDIV APPRX 60: CPT | Mod: S$GLB,,, | Performed by: SPEECH-LANGUAGE PATHOLOGIST

## 2025-02-26 PROCEDURE — 99999 PR PBB SHADOW E&M-EST. PATIENT-LVL II: CPT | Mod: PBBFAC,,, | Performed by: SPEECH-LANGUAGE PATHOLOGIST

## 2025-02-26 RX ORDER — VARENICLINE TARTRATE 1 MG/1
TABLET, FILM COATED ORAL
Qty: 60 TABLET | Refills: 0 | Status: SHIPPED | OUTPATIENT
Start: 2025-02-26

## 2025-02-26 NOTE — PROGRESS NOTES
Individual Follow-Up Form    2/26/2025    Quit Date: TBD    Clinical Status of Patient: Outpatient    Length of Service: 60 minutes    Continuing Medication: yes  Chantix, 21 mg patches (CLEAR), 4 mg lozenges    Other Medications:      Target Symptoms: Withdrawal and medication side effects. The following were  rated moderate (3) to severe (4) on TCRS:  Moderate (3):   Severe (4):     Comments: Patient seen in clinic. Patient reports she is smoking 2-3 little cigars. Assessed CO. CO 11. She reports utilizing the Varenicline, 21 mg clear patches & 4 mg lozenges. She states she did put the cigarettes in her car console once. She reports her trigger is driving in the car where she smokes the 2 cigarettes & then has 1 at home. Discussed other strategies to separate tobacco use from the activity of driving such as using the lozenge ahead of getting in the car & waiting until she gets to the gym to smoke. The patient reports she is taking actions to address her diabetes to decrease her A1C level. Discussed the impact of smoking on diabetes & A1C level. Goal is to try a different strategy to deter smoking while driving. Follow up set for 3/31/2025 at 3:00 pm.     Diagnosis: F17.200    Next Visit: 5 weeks     Congruent

## 2025-03-31 ENCOUNTER — CLINICAL SUPPORT (OUTPATIENT)
Dept: SMOKING CESSATION | Facility: CLINIC | Age: 50
End: 2025-03-31
Payer: COMMERCIAL

## 2025-03-31 DIAGNOSIS — F17.200 NICOTINE DEPENDENCE: Primary | ICD-10-CM

## 2025-03-31 PROCEDURE — 99404 PREV MED CNSL INDIV APPRX 60: CPT | Mod: S$GLB,,, | Performed by: SPEECH-LANGUAGE PATHOLOGIST

## 2025-03-31 PROCEDURE — 99999 PR PBB SHADOW E&M-EST. PATIENT-LVL II: CPT | Mod: PBBFAC,,, | Performed by: SPEECH-LANGUAGE PATHOLOGIST

## 2025-03-31 RX ORDER — VARENICLINE TARTRATE 1 MG/1
TABLET, FILM COATED ORAL
Qty: 60 TABLET | Refills: 0 | Status: SHIPPED | OUTPATIENT
Start: 2025-03-31

## 2025-03-31 NOTE — PROGRESS NOTES
Individual Follow-Up Form    3/31/2025    Quit Date: TBD    Clinical Status of Patient: Outpatient    Length of Service: 60 minutes    Continuing Medication: yes  Chantix, 21 mg patches & 4 mg lozenges    Other Medications:      Target Symptoms: Withdrawal and medication side effects. The following were  rated moderate (3) to severe (4) on TCRS:  Moderate (3):   Severe (4):     Comments: Patient seen in clinic. Patient reports she hasn't been using the patches due to getting out of a routine & it has increased her smoking from 2 cpd to 10 cpd. She reports using Varenicline; but hasn't been using the patches or lozenges as much. Assessed CO with patient reporting smoking 3 prior.  CO 9. Patient reports she didn't try the strategy of using the lozenge before getting in the car to go to the gym & leaving the gym. Discussed with the patient the events that may have contributed toward her increase which presents to be not utilizing the 21 mg patches. Discussed the option of adding a 7 mg patches if the patient feels that would assist with getting over the hump. Patient states she doesn't feel 7 mg would be enough for her based on her smoking history. Educated the patient on the role of changing behavior patterns in addition to her returning the consistency with her routine of patch, lozenge & Varenicline usage. Patient is open to putting the strategy of taking the lozenge before the gym to assess her gym performance when she hasn't smoked before vs when she has smoked & to change routine/habits to address the driving cigarettes.  Follow up set for 4/30/2025 at 3:00 pm.     Diagnosis: F17.200    Next Visit: 4 weeks

## 2025-04-03 DIAGNOSIS — F17.200 NICOTINE DEPENDENCE: ICD-10-CM

## 2025-04-03 RX ORDER — IBUPROFEN 200 MG
1 TABLET ORAL DAILY
Qty: 28 PATCH | Refills: 0 | Status: SHIPPED | OUTPATIENT
Start: 2025-04-03

## 2025-04-30 ENCOUNTER — CLINICAL SUPPORT (OUTPATIENT)
Dept: SMOKING CESSATION | Facility: CLINIC | Age: 50
End: 2025-04-30
Payer: COMMERCIAL

## 2025-04-30 DIAGNOSIS — F17.200 NICOTINE DEPENDENCE: Primary | ICD-10-CM

## 2025-04-30 PROCEDURE — 99404 PREV MED CNSL INDIV APPRX 60: CPT | Mod: S$GLB,,, | Performed by: SPEECH-LANGUAGE PATHOLOGIST

## 2025-04-30 PROCEDURE — 99999 PR PBB SHADOW E&M-EST. PATIENT-LVL II: CPT | Mod: PBBFAC,,, | Performed by: SPEECH-LANGUAGE PATHOLOGIST

## 2025-04-30 RX ORDER — VARENICLINE TARTRATE 1 MG/1
TABLET, FILM COATED ORAL
Qty: 60 TABLET | Refills: 0 | Status: SHIPPED | OUTPATIENT
Start: 2025-04-30

## 2025-04-30 NOTE — PROGRESS NOTES
"Individual Follow-Up Form    4/30/2025    Quit Date: TBD    Clinical Status of Patient: Outpatient    Length of Service: 60 minutes    Continuing Medication: yes  Chantix, 21 mg patches & 4 mg lozenges (reports not using the lozenges)    Other Medications:      Target Symptoms: Withdrawal and medication side effects. The following were  rated moderate (3) to severe (4) on TCRS:  Moderate (3):   Severe (4):     Comments: Patient seen in clinic. Patient reports she has gotten out of her routine due to multiple drs appointments & procedures & hasn't been wearing her patches. She reports she is smoking around 10 cpd which are little cigars. She reports she does take Varenicline 1 mg twice daily. Assessed CO with patient reporting smoking 10 prior. CO 41. Patient reports she hasn't been going to the gym to work out that she used to attend 4-5x per week due to the medical appointments & procedures as well. She reports when she was on her routine she was smoking 1-2 per day. Patient reports she is dealing with a lot of anxiety & fear regarding a mass that she has on her back & feels she needs to smoke to "calm her nerves". Discussed with the patient the impact of smoking on healing & anesthesia in addition to the illusion of calming new to the nicotine levels. Discussed with the patient the role of healthy coping strategies to manage emotions & times of uncertainty & the impact of emotional triggers with smoking behaviors. Provided the patient with a podcast resource & will provide with written literature for the patient to use outside of session to reflect upon. Goal is to get back on track with a consistent routine & return to addressing driving trigger. Follow up set for 5/28/2025 at 3:00 pm.       Diagnosis: F17.200    Next Visit: 4 weeks    "

## 2025-05-28 ENCOUNTER — CLINICAL SUPPORT (OUTPATIENT)
Dept: SMOKING CESSATION | Facility: CLINIC | Age: 50
End: 2025-05-28
Payer: COMMERCIAL

## 2025-05-28 DIAGNOSIS — F17.200 NICOTINE DEPENDENCE: Primary | ICD-10-CM

## 2025-05-28 PROCEDURE — 99404 PREV MED CNSL INDIV APPRX 60: CPT | Mod: S$GLB,,, | Performed by: SPEECH-LANGUAGE PATHOLOGIST

## 2025-05-28 PROCEDURE — 99999 PR PBB SHADOW E&M-EST. PATIENT-LVL II: CPT | Mod: PBBFAC,,, | Performed by: SPEECH-LANGUAGE PATHOLOGIST

## 2025-05-28 RX ORDER — VARENICLINE TARTRATE 1 MG/1
TABLET, FILM COATED ORAL
Qty: 60 TABLET | Refills: 0 | Status: SHIPPED | OUTPATIENT
Start: 2025-05-28

## 2025-05-28 NOTE — PROGRESS NOTES
Individual Follow-Up Form    5/28/2025    Quit Date: TBD    Clinical Status of Patient: Outpatient    Length of Service: 60 minutes    Continuing Medication: yes  Chantix, 21 mg patches & 4 mg lozenges (reports not using the patches or lozenges)    Other Medications:      Target Symptoms: Withdrawal and medication side effects. The following were  rated moderate (3) to severe (4) on TCRS:  Moderate (3):   Severe (4):     Comments: Patient seen in clinic. Patient reports smoking 10 little cigars per day. She reports she didn't smoke on the way to this appointment which is a first for her due to driving is a trigger. She reports she wanted to see what her CO level would be if she didn't smoke prior to the appointment. Assessed CO with patient reporting smoking 2 prior. CO 16. Re-educated patient on the impact of CO with cigars vs cigarettes in addition to the reading will be triggered with any tobacco usage. Patient reports she has only smoked 2 little cigars today. Patient reports she hasn't been  using the patches or lozenges. She reports she has been under a great deal of stress dealing with multiple medical appointments due to medical concerns. She states now that those have been addressed; she will be able to focus on returning to going to the gym, using the patches & lozenges & focusing on her smoking. Patient states she had a dermatology appointment & was informed about the impact of smoking on her skin. Patient reports she is receiving skin treatments at a [facility]. Discussed the impact of smoking on skin appearance & collagen production. Patient states she is willing to do an experiment with not smoking at all the day of the next appointment to see her CO level. Follow up set for 6/30/2025 at 3:00 pm.     Diagnosis: F17.200    Next Visit: 5 weeks

## 2025-06-17 ENCOUNTER — TELEPHONE (OUTPATIENT)
Dept: SMOKING CESSATION | Facility: CLINIC | Age: 50
End: 2025-06-17
Payer: OTHER GOVERNMENT

## 2025-06-17 NOTE — TELEPHONE ENCOUNTER
Patient stopped by the clinic to drop something off & reports she hasn't smoked since last night around 6:00 pm. Assessed CO. CO 8.

## 2025-06-30 ENCOUNTER — CLINICAL SUPPORT (OUTPATIENT)
Dept: SMOKING CESSATION | Facility: CLINIC | Age: 50
End: 2025-06-30
Payer: COMMERCIAL

## 2025-06-30 DIAGNOSIS — F17.200 NICOTINE DEPENDENCE: Primary | ICD-10-CM

## 2025-06-30 PROCEDURE — 99404 PREV MED CNSL INDIV APPRX 60: CPT | Mod: S$PBB,,, | Performed by: SPEECH-LANGUAGE PATHOLOGIST

## 2025-06-30 PROCEDURE — 99211 OFF/OP EST MAY X REQ PHY/QHP: CPT | Mod: PBBFAC | Performed by: SPEECH-LANGUAGE PATHOLOGIST

## 2025-06-30 PROCEDURE — 99999 PR PBB SHADOW E&M-EST. PATIENT-LVL I: CPT | Mod: PBBFAC,,, | Performed by: SPEECH-LANGUAGE PATHOLOGIST

## 2025-06-30 RX ORDER — VARENICLINE TARTRATE 1 MG/1
TABLET, FILM COATED ORAL
Qty: 60 TABLET | Refills: 0 | Status: SHIPPED | OUTPATIENT
Start: 2025-06-30

## 2025-06-30 NOTE — PROGRESS NOTES
Individual Follow-Up Form    6/30/2025    Quit Date: TBD    Clinical Status of Patient: Outpatient    Length of Service: 60 minutes    Continuing Medication: yes  Chantix,  21 mg patches & 4 mg lozenges (reports not using the patches)       Other Medications:      Target Symptoms: Withdrawal and medication side effects. The following were  rated moderate (3) to severe (4) on TCRS:  Moderate (3):   Severe (4):     Comments: Patient seen in clinic. Patient reports she went for 3 days without smoking beginning 6/17/2025-6/19/2025; but the cravings got too much so she smoked a cigarette. At the time of this session, patient reports she hasn't smoked since last night. Assessed CO. CO 4. Praised the patient on her efforts with making days smoke free due to this has been the longest quit the patient has achieved. Discussed with the patient taking the information that she learned about how her body felt during the day where the craving got to be too much to apply other strategies in addition to the strategies that she used to remain quit during those days to assist her with getting over the hump. Discussed not having access to tobacco & increasing the lozenge usage. Goal is to make is over a 3 day quit. Follow up set for 7/30/2025 at 3:00 pm.       Diagnosis: F17.200    Next Visit: 4 weeks

## 2025-07-30 ENCOUNTER — CLINICAL SUPPORT (OUTPATIENT)
Dept: SMOKING CESSATION | Facility: CLINIC | Age: 50
End: 2025-07-30
Payer: COMMERCIAL

## 2025-07-30 DIAGNOSIS — F17.200 NICOTINE DEPENDENCE: Primary | ICD-10-CM

## 2025-07-30 PROCEDURE — 99404 PREV MED CNSL INDIV APPRX 60: CPT | Mod: S$GLB,,, | Performed by: SPEECH-LANGUAGE PATHOLOGIST

## 2025-07-30 PROCEDURE — 99999 PR PBB SHADOW E&M-EST. PATIENT-LVL II: CPT | Mod: PBBFAC,,, | Performed by: SPEECH-LANGUAGE PATHOLOGIST

## 2025-07-30 RX ORDER — VARENICLINE TARTRATE 1 MG/1
TABLET, FILM COATED ORAL
Qty: 60 TABLET | Refills: 0 | Status: SHIPPED | OUTPATIENT
Start: 2025-07-30

## 2025-07-30 NOTE — PROGRESS NOTES
"Individual Follow-Up Form    7/30/2025    Quit Date: TBD    Clinical Status of Patient: Outpatient    Length of Service: 60 minutes    Continuing Medication: yes  Chantix, 4 mg lozenges, 21 mg patches (not using patches)    Other Medications:      Target Symptoms: Withdrawal and medication side effects. The following were  rated moderate (3) to severe (4) on TCRS:  Moderate (3):   Severe (4):     Comments: Patient seen in clinic. Patient reports she "fell off the wagon" & has been smoking. She would go for 3 days without smoking; but due to the craving intensity, she would smoke.  She reports she wasn't using the lozenges during that time. Re-educated patient on the impact of waxing & waning nicotine levels & the impact on the brain & homeostasis with the nicotine. Discussed with the patient the role of self compassion & the impact of positive self talk for additional attempts vs negative self talk resulting in full relapse. Encouraged the patient to utilize self compassion during this time to reduce the fear around taking another step forward due to her stated frustration of not making it past day 3. Written literature provided to the patient for reference. Built on the patient's history with the sessions & how far the patient has come & due to the fact that she had not made a quit attempt since she has been a tobacco user & that she was smoking 40-60 cigarettes at [facility], the patient has been making effort & working toward her reduction training her body to go for longer periods of time without nicotine. Patient has also shifted from not wanting to quit to wanting to quit & stating she "hates" smoking. The patient is utilizing Varenicline 1 mg twice daily. No negative side effects or mood changes noted. Patient reports that she does find it helpful & effective for her. Explored strategies the patient could implement to assist with planning for day 3 that could include using the lozenges regardless if she is " experiencing a craving. The patient states that is a realistic strategy & plan that she can implement between now & the next appointment. Goal is to utilize the lozenges to assist with getting past day 3. Follow up set for 8/26/2025 at 3:00 pm.     Diagnosis: F17.200    Next Visit: 4 weeks     Repair Performed By Another Provider Text (Leave Blank If You Do Not Want): After the tissue was excised the defect was repaired by another provider.

## 2025-08-26 ENCOUNTER — CLINICAL SUPPORT (OUTPATIENT)
Dept: SMOKING CESSATION | Facility: CLINIC | Age: 50
End: 2025-08-26
Payer: COMMERCIAL

## 2025-08-26 DIAGNOSIS — F17.200 NICOTINE DEPENDENCE: Primary | ICD-10-CM

## 2025-08-26 PROCEDURE — 99404 PREV MED CNSL INDIV APPRX 60: CPT | Mod: S$GLB,,, | Performed by: SPEECH-LANGUAGE PATHOLOGIST

## 2025-08-26 PROCEDURE — 99999 PR PBB SHADOW E&M-EST. PATIENT-LVL II: CPT | Mod: PBBFAC,,, | Performed by: SPEECH-LANGUAGE PATHOLOGIST

## 2025-08-26 RX ORDER — VARENICLINE TARTRATE 1 MG/1
TABLET, FILM COATED ORAL
Qty: 60 TABLET | Refills: 0 | Status: SHIPPED | OUTPATIENT
Start: 2025-08-26